# Patient Record
Sex: FEMALE | Race: WHITE | Employment: FULL TIME | URBAN - METROPOLITAN AREA
[De-identification: names, ages, dates, MRNs, and addresses within clinical notes are randomized per-mention and may not be internally consistent; named-entity substitution may affect disease eponyms.]

---

## 2017-01-16 ENCOUNTER — OFFICE VISIT (OUTPATIENT)
Dept: FAMILY MEDICINE CLINIC | Age: 47
End: 2017-01-16

## 2017-01-16 VITALS
OXYGEN SATURATION: 99 % | TEMPERATURE: 98 F | BODY MASS INDEX: 37.39 KG/M2 | RESPIRATION RATE: 20 BRPM | WEIGHT: 211 LBS | SYSTOLIC BLOOD PRESSURE: 130 MMHG | HEART RATE: 92 BPM | HEIGHT: 63 IN | DIASTOLIC BLOOD PRESSURE: 87 MMHG

## 2017-01-16 DIAGNOSIS — G43.009 NONINTRACTABLE MIGRAINE, UNSPECIFIED MIGRAINE TYPE: ICD-10-CM

## 2017-01-16 DIAGNOSIS — F33.1 MODERATE EPISODE OF RECURRENT MAJOR DEPRESSIVE DISORDER (HCC): ICD-10-CM

## 2017-01-16 DIAGNOSIS — E03.9 HYPOTHYROIDISM, UNSPECIFIED TYPE: Chronic | ICD-10-CM

## 2017-01-16 DIAGNOSIS — L60.0 INGROWN TOENAIL: ICD-10-CM

## 2017-01-16 DIAGNOSIS — F33.1 MODERATE EPISODE OF RECURRENT MAJOR DEPRESSIVE DISORDER (HCC): Primary | ICD-10-CM

## 2017-01-16 DIAGNOSIS — B35.1 NAIL FUNGAL INFECTION: ICD-10-CM

## 2017-01-16 RX ORDER — ZOLMITRIPTAN 5 MG/1
5 TABLET, FILM COATED ORAL AS NEEDED
Qty: 10 TAB | Refills: 3 | Status: SHIPPED | OUTPATIENT
Start: 2017-01-16 | End: 2017-07-28 | Stop reason: SDUPTHER

## 2017-01-16 RX ORDER — TERBINAFINE HYDROCHLORIDE 250 MG/1
250 TABLET ORAL DAILY
Qty: 90 TAB | Refills: 0 | Status: SHIPPED | OUTPATIENT
Start: 2017-01-16 | End: 2019-05-13

## 2017-01-16 RX ORDER — VENLAFAXINE HYDROCHLORIDE 75 MG/1
75 CAPSULE, EXTENDED RELEASE ORAL DAILY
Qty: 30 CAP | Refills: 3 | Status: SHIPPED | OUTPATIENT
Start: 2017-01-16 | End: 2017-02-28 | Stop reason: SDUPTHER

## 2017-01-16 RX ORDER — LEVOTHYROXINE SODIUM 150 UG/1
150 TABLET ORAL
Qty: 30 TAB | Refills: 5 | Status: SHIPPED | OUTPATIENT
Start: 2017-01-16 | End: 2017-06-20 | Stop reason: SDUPTHER

## 2017-01-16 RX ORDER — SUMATRIPTAN 50 MG/1
50 TABLET, FILM COATED ORAL
Qty: 10 TAB | Refills: 3 | Status: SHIPPED | OUTPATIENT
Start: 2017-01-16 | End: 2017-07-28 | Stop reason: SDUPTHER

## 2017-01-16 NOTE — PATIENT INSTRUCTIONS
Depression and Chronic Disease: Care Instructions  Your Care Instructions  A chronic disease is one that you have for a long time. Some chronic diseases can be controlled, but they usually cannot be cured. Depression is common in people with chronic diseases, but it often goes unnoticed. Many people have concerns about seeking treatment for a mental health problem. You may think it's a sign of weakness, or you don't want people to know about it. It's important to overcome these reasons for not seeking treatment. Treating depression or anxiety is good for your health. Follow-up care is a key part of your treatment and safety. Be sure to make and go to all appointments, and call your doctor if you are having problems. It's also a good idea to know your test results and keep a list of the medicines you take. How can you care for yourself at home? Watch for symptoms of depression  The symptoms of depression are often subtle at first. You may think they are caused by your disease rather than depression. Or you may think it is normal to be depressed when you have a chronic disease. If you are depressed you may:  · Feel sad or hopeless. · Feel guilty or worthless. · Not enjoy the things you used to enjoy. · Feel hopeless, as though life is not worth living. · Have trouble thinking or remembering. · Have low energy, and you may not eat or sleep well. · Pull away from others. · Think often about death or killing yourself. (Keep the numbers for these national suicide hotlines: 4-229-991-TALK [1-821.157.2831] and 2-350-SYYLVZS [1-239.229.5817]. )  Get treatment  By treating your depression, you can feel more hopeful and have more energy. If you feel better, you may take better care of yourself, so your health may improve. · Talk to your doctor if you have any changes in mood during treatment for your disease. · Ask your doctor for help.  Counseling, antidepressant medicine, or a combination of the two can help most people with depression. Often a combination works best. Counseling can also help you cope with having a chronic disease. When should you call for help? Call 911 anytime you think you may need emergency care. For example, call if:  · You feel like hurting yourself or someone else. · Someone you know has depression and is about to attempt or is attempting suicide. Call your doctor now or seek immediate medical care if:  · You hear voices. · Someone you know has depression and:  ¨ Starts to give away his or her possessions. ¨ Uses illegal drugs or drinks alcohol heavily. ¨ Talks or writes about death, including writing suicide notes or talking about guns, knives, or pills. ¨ Starts to spend a lot of time alone. ¨ Acts very aggressively or suddenly appears calm. Watch closely for changes in your health, and be sure to contact your doctor if:  · You do not get better as expected. Where can you learn more? Go to http://nathaly-jp.info/. Enter V704 in the search box to learn more about \"Depression and Chronic Disease: Care Instructions. \"  Current as of: July 26, 2016  Content Version: 11.1  © 3326-3266 [employer]. Care instructions adapted under license by [employer] (which disclaims liability or warranty for this information). If you have questions about a medical condition or this instruction, always ask your healthcare professional. [address] any warranty or liability for your use of this information. Depression and Chronic Disease: Care Instructions  Your Care Instructions  A chronic disease is one that you have for a long time. Some chronic diseases can be controlled, but they usually cannot be cured. Depression is common in people with chronic diseases, but it often goes unnoticed. Many people have concerns about seeking treatment for a mental health problem.  You may think it's a sign of weakness, or you don't want people to know about it. It's important to overcome these reasons for not seeking treatment. Treating depression or anxiety is good for your health. Follow-up care is a key part of your treatment and safety. Be sure to make and go to all appointments, and call your doctor if you are having problems. It's also a good idea to know your test results and keep a list of the medicines you take. How can you care for yourself at home? Watch for symptoms of depression  The symptoms of depression are often subtle at first. You may think they are caused by your disease rather than depression. Or you may think it is normal to be depressed when you have a chronic disease. If you are depressed you may:  · Feel sad or hopeless. · Feel guilty or worthless. · Not enjoy the things you used to enjoy. · Feel hopeless, as though life is not worth living. · Have trouble thinking or remembering. · Have low energy, and you may not eat or sleep well. · Pull away from others. · Think often about death or killing yourself. (Keep the numbers for these national suicide hotlines: 2-725-559-TALK [1-729.318.9990] and 2-063-WBESKFS [1-659.764.2795]. )  Get treatment  By treating your depression, you can feel more hopeful and have more energy. If you feel better, you may take better care of yourself, so your health may improve. · Talk to your doctor if you have any changes in mood during treatment for your disease. · Ask your doctor for help. Counseling, antidepressant medicine, or a combination of the two can help most people with depression. Often a combination works best. Counseling can also help you cope with having a chronic disease. When should you call for help? Call 911 anytime you think you may need emergency care. For example, call if:  · You feel like hurting yourself or someone else. · Someone you know has depression and is about to attempt or is attempting suicide.   Call your doctor now or seek immediate medical care if:  · You hear voices. · Someone you know has depression and:  ¨ Starts to give away his or her possessions. ¨ Uses illegal drugs or drinks alcohol heavily. ¨ Talks or writes about death, including writing suicide notes or talking about guns, knives, or pills. ¨ Starts to spend a lot of time alone. ¨ Acts very aggressively or suddenly appears calm. Watch closely for changes in your health, and be sure to contact your doctor if:  · You do not get better as expected. Where can you learn more? Go to http://nathaly-jp.info/. Enter X478 in the search box to learn more about \"Depression and Chronic Disease: Care Instructions. \"  Current as of: July 26, 2016  Content Version: 11.1  © 7102-4705 [employer]. Care instructions adapted under license by [employer] (which disclaims liability or warranty for this information). If you have questions about a medical condition or this instruction, always ask your healthcare professional. [place] [address] any warranty or liability for your use of this information. Toenail Fungus: Care Instructions  Your Care Instructions  A toenail that is infected by a fungus usually turns white or yellow. As the fungus spreads, the nail turns a darker color and gets thicker, and its edges start to turn ragged and crumble. A bad infection can cause toe pain, and the nail may pull away from the toe. Toenails that are exposed to moisture and warmth a lot are more likely to get infected by a fungus. This can happen from wearing sweaty shoes often and from walking barefoot on shower floors. It is hard to treat toenail fungus, and the infection can return after it has cleared up. But medicines can sometimes get rid of toenail fungus for good. If the infection is very bad, or if it causes a lot of pain, you may need to have the nail removed. Follow-up care is a key part of your treatment and safety.  Be sure to make and go to all appointments, and call your doctor if you are having problems. Its also a good idea to know your test results and keep a list of the medicines you take. How can you care for yourself at home? · Take your medicines exactly as prescribed. Call your doctor if you have any problems with your medicine. You will get more details on the specific medicines your doctor prescribes. · If your doctor gave you a cream or liquid to put on your toenail, use it exactly as directed. · Wash your feet often, and wash your hands after touching your feet. · Keep your toenails clean and dry. Dry your feet completely after you bathe and before you put on shoes and socks. · Keep your toenails trimmed. · Change socks often. Wear dry socks that absorb moisture. · Do not go barefoot in public places. · Use a spray or powder that fights fungus on your feet and in your shoes. · Do not pick at the skin around your nails. · Do not use nail polish or fake nails on your toenails. When should you call for help? Call your doctor now or seek immediate medical care if:  · You have signs of infection, such as:  ¨ Increased pain, swelling, warmth, or redness. ¨ Red streaks leading from the site. ¨ Pus draining from the site. ¨ A fever. · You have new or increased toe pain. Watch closely for changes in your health, and be sure to contact your doctor if:  · You do not get better as expected. Where can you learn more? Go to http://nathaly-jp.info/. Enter D202 in the search box to learn more about \"Toenail Fungus: Care Instructions. \"  Current as of: February 5, 2016  Content Version: 11.1  © 1984-5238 [employer]. Care instructions adapted under license by [employer] (which disclaims liability or warranty for this information).  If you have questions about a medical condition or this instruction, always ask your healthcare professional. Zuly Robles disclaims any warranty or liability for your use of this information.

## 2017-01-16 NOTE — LETTER
1/16/2017 11:25 AM 
 
Ms. Brandon Chaves 
[address] [facility] [address] You were referred to Podiatry. Please reference your appointment listed below: 
 
Dr. Shaun Becerril. [address] [place], [address] [facility] [address],[place] [address] 
[phone] Appointment Information: February 1, 2017 at 1:30pm. 
 
 
 
Sincerely, [facility]

## 2017-01-26 ENCOUNTER — OFFICE VISIT (OUTPATIENT)
Dept: FAMILY MEDICINE CLINIC | Age: 47
End: 2017-01-26

## 2017-01-26 VITALS
SYSTOLIC BLOOD PRESSURE: 130 MMHG | HEART RATE: 96 BPM | HEIGHT: 63 IN | OXYGEN SATURATION: 95 % | DIASTOLIC BLOOD PRESSURE: 87 MMHG | BODY MASS INDEX: 37.39 KG/M2 | TEMPERATURE: 98.7 F | WEIGHT: 211 LBS | RESPIRATION RATE: 20 BRPM

## 2017-01-26 DIAGNOSIS — J40 BRONCHITIS: Primary | ICD-10-CM

## 2017-01-26 RX ORDER — AZITHROMYCIN 250 MG/1
TABLET, FILM COATED ORAL
Qty: 6 TAB | Refills: 0 | Status: SHIPPED | OUTPATIENT
Start: 2017-01-26 | End: 2017-01-31

## 2017-01-26 RX ORDER — FLUCONAZOLE 150 MG/1
150 TABLET ORAL DAILY
Qty: 1 TAB | Refills: 0 | Status: SHIPPED | OUTPATIENT
Start: 2017-01-26 | End: 2017-01-27

## 2017-01-26 NOTE — PATIENT INSTRUCTIONS
Bronchitis: Care Instructions  Your Care Instructions    Bronchitis is inflammation of the bronchial tubes, which carry air to the lungs. The tubes swell and produce mucus, or phlegm. The mucus and inflamed bronchial tubes make you cough. You may have trouble breathing. Most cases of bronchitis are caused by viruses like those that cause colds. Antibiotics usually do not help and they may be harmful. Bronchitis usually develops rapidly and lasts about 2 to 3 weeks in otherwise healthy people. Follow-up care is a key part of your treatment and safety. Be sure to make and go to all appointments, and call your doctor if you are having problems. It's also a good idea to know your test results and keep a list of the medicines you take. How can you care for yourself at home? · Take all medicines exactly as prescribed. Call your doctor if you think you are having a problem with your medicine. · Get some extra rest.  · Take an over-the-counter pain medicine, such as acetaminophen (Tylenol), ibuprofen (Advil, Motrin), or naproxen (Aleve) to reduce fever and relieve body aches. Read and follow all instructions on the label. · Do not take two or more pain medicines at the same time unless the doctor told you to. Many pain medicines have acetaminophen, which is Tylenol. Too much acetaminophen (Tylenol) can be harmful. · Take an over-the-counter cough medicine that contains dextromethorphan to help quiet a dry, hacking cough so that you can sleep. Avoid cough medicines that have more than one active ingredient. Read and follow all instructions on the label. · Breathe moist air from a humidifier, hot shower, or sink filled with hot water. The heat and moisture will thin mucus so you can cough it out. · Do not smoke. Smoking can make bronchitis worse. If you need help quitting, talk to your doctor about stop-smoking programs and medicines. These can increase your chances of quitting for good.   When should you call for help? Call 911 anytime you think you may need emergency care. For example, call if:  · You have severe trouble breathing. Call your doctor now or seek immediate medical care if:  · You have new or worse trouble breathing. · You cough up dark brown or bloody mucus (sputum). · You have a new or higher fever. · You have a new rash. Watch closely for changes in your health, and be sure to contact your doctor if:  · You cough more deeply or more often, especially if you notice more mucus or a change in the color of your mucus. · You are not getting better as expected. Where can you learn more? Go to http://nathaly-jp.info/. Enter H333 in the search box to learn more about \"Bronchitis: Care Instructions. \"  Current as of: May 23, 2016  Content Version: 11.1  © 3289-3568 [employer]. Care instructions adapted under license by [employer] (which disclaims liability or warranty for this information). If you have questions about a medical condition or this instruction, always ask your healthcare professional. [address] any warranty or liability for your use of this information.

## 2017-01-26 NOTE — MR AVS SNAPSHOT
Visit Information Date & Time Provider Department Dept. Phone Encounter #  
 1/26/2017  4:45 PM Uriel Mariee MD [facility] [phone] [id number] Upcoming Health Maintenance Date Due DTaP/Tdap/Td series (1 - Tdap) 10/25/1992 INFLUENZA AGE 9 TO ADULT 8/1/2016 PAP AKA CERVICAL CYTOLOGY 9/23/2018 Allergies as of 1/26/2017  Review Complete On: 1/26/2017 By: Uriel Mariee MD  
  
 Severity Noted Reaction Type Reactions Anesthetics - Niurka Type- Parabens  08/15/2016   Side Effect Itching Sulfa (Sulfonamide Antibiotics)  07/28/2014    Shortness of Breath Anesthetics - Amide Type Low 08/15/2016   Side Effect Itching Current Immunizations  Reviewed on 10/12/2015 Name Date Influenza Vaccine 10/12/2015, 1/9/2015 Not reviewed this visit You Were Diagnosed With   
  
 Codes Comments Bronchitis    -  Primary ICD-10-CM: L73 ICD-9-CM: 389 Vitals BP Pulse Temp Resp Height(growth percentile) Weight(growth percentile) 130/87 (BP 1 Location: Left arm, BP Patient Position: Sitting) 96 98.7 °F (37.1 °C) (Oral) 20 5' 3\" (1.6 m) 211 lb (95.7 kg) SpO2 BMI OB Status Smoking Status 95% 37.38 kg/m2 Hysterectomy Never Smoker Vitals History BMI and BSA Data Body Mass Index Body Surface Area  
 37.38 kg/m 2 2.06 m 2 Preferred Pharmacy Pharmacy Name Phone [facility], [address] [place] [address] [phone] Your Updated Medication List  
  
   
This list is accurate as of: 1/26/17  5:13 PM.  Always use your most recent med list.  
  
  
  
  
 albuterol 90 mcg/actuation inhaler Commonly known as:  PROVENTIL HFA, VENTOLIN HFA, PROAIR HFA Take 2 puffs by inhalation every four (4) hours as needed for Wheezing or Shortness of Breath. azithromycin 250 mg tablet Commonly known as:  Karmen Christy Take 2 tablets today, then take 1 tablet daily COLACE 100 mg capsule Generic drug:  docusate sodium Take 100 mg by mouth two (2) times a day. fluconazole 150 mg tablet Commonly known as:  DIFLUCAN Take 1 Tab by mouth daily for 1 day. Only take after you have completed the full antibiotic (zpac course) ibuprofen 800 mg tablet Commonly known as:  MOTRIN Take 1 Tab by mouth three (3) times daily as needed for Pain.  
  
 levothyroxine 150 mcg tablet Commonly known as:  SYNTHROID Take 1 Tab by mouth Daily (before breakfast). LINZESS 145 mcg Cap capsule Generic drug:  linaclotide Take 145 mcg by mouth Daily (before breakfast). MINIVELLE 0.05 mg/24 hr  
Generic drug:  estradiol 1 patch by TransDERmal route Every Saturday. multivitamin tablet Commonly known as:  ONE A DAY Take 1 Tab by mouth daily. oxyCODONE-acetaminophen 5-325 mg per tablet Commonly known as:  PERCOCET  
1-2 tablets every 4-6 hours prn pain SUMAtriptan 50 mg tablet Commonly known as:  IMITREX Take 1 Tab by mouth once as needed for Migraine for up to 1 dose. terbinafine HCl 250 mg tablet Commonly known as:  LAMISIL Take 1 Tab by mouth daily. venlafaxine-SR 75 mg capsule Commonly known as:  EFFEXOR XR Take 1 Cap by mouth daily. ZOLMitriptan 5 mg tablet Commonly known as:  ZOMIG Take 1 Tab by mouth as needed for Migraine. Prescriptions Sent to Pharmacy Refills  
 azithromycin (ZITHROMAX) 250 mg tablet 0 Sig: Take 2 tablets today, then take 1 tablet daily Class: Normal  
 Pharmacy: [facility] Chiquis Mcgrath Ph #: [phone]  
 fluconazole (DIFLUCAN) 150 mg tablet 0 Sig: Take 1 Tab by mouth daily for 1 day. Only take after you have completed the full antibiotic (zpac course) Class: Normal  
 Pharmacy: [facility] [place] [address], 9 [facility] Ph #: [phone]  Route: Oral  
  
 Patient Instructions Bronchitis: Care Instructions Your Care Instructions Bronchitis is inflammation of the bronchial tubes, which carry air to the lungs. The tubes swell and produce mucus, or phlegm. The mucus and inflamed bronchial tubes make you cough. You may have trouble breathing. Most cases of bronchitis are caused by viruses like those that cause colds. Antibiotics usually do not help and they may be harmful. Bronchitis usually develops rapidly and lasts about 2 to 3 weeks in otherwise healthy people. Follow-up care is a key part of your treatment and safety. Be sure to make and go to all appointments, and call your doctor if you are having problems. It's also a good idea to know your test results and keep a list of the medicines you take. How can you care for yourself at home? · Take all medicines exactly as prescribed. Call your doctor if you think you are having a problem with your medicine. · Get some extra rest. 
· Take an over-the-counter pain medicine, such as acetaminophen (Tylenol), ibuprofen (Advil, Motrin), or naproxen (Aleve) to reduce fever and relieve body aches. Read and follow all instructions on the label. · Do not take two or more pain medicines at the same time unless the doctor told you to. Many pain medicines have acetaminophen, which is Tylenol. Too much acetaminophen (Tylenol) can be harmful. · Take an over-the-counter cough medicine that contains dextromethorphan to help quiet a dry, hacking cough so that you can sleep. Avoid cough medicines that have more than one active ingredient. Read and follow all instructions on the label. · Breathe moist air from a humidifier, hot shower, or sink filled with hot water. The heat and moisture will thin mucus so you can cough it out. · Do not smoke. Smoking can make bronchitis worse. If you need help quitting, talk to your doctor about stop-smoking programs and medicines. These can increase your chances of quitting for good. When should you call for help? Call 911 anytime you think you may need emergency care. For example, call if: 
· You have severe trouble breathing. Call your doctor now or seek immediate medical care if: 
· You have new or worse trouble breathing. · You cough up dark brown or bloody mucus (sputum). · You have a new or higher fever. · You have a new rash. Watch closely for changes in your health, and be sure to contact your doctor if: 
· You cough more deeply or more often, especially if you notice more mucus or a change in the color of your mucus. · You are not getting better as expected. Where can you learn more? Go to http://nathaly-jp.info/. Enter H333 in the search box to learn more about \"Bronchitis: Care Instructions. \" Current as of: May 23, 2016 Content Version: 11.1 © 2843-8000 [employer]. Care instructions adapted under license by [employer] (which disclaims liability or warranty for this information). If you have questions about a medical condition or this instruction, always ask your healthcare professional. [place] [address] any warranty or liability for your use of this information. Please provide this summary of care documentation to your next provider. Your primary care clinician is listed as Maxime Harris. If you have any questions after today's visit, please call [phone].

## 2017-01-26 NOTE — LETTER
NOTIFICATION OF RETURN TO WORK 
 
1/26/2017 Ms. Thalia Gunn 
[address] [facility] [address] To Whom It May Concern: 
 
Zaida Agustin was under the care of [facility] please excuse her absence on 1/26/2017 & 1/27/2017. She will return to work on 1/30/2017 with no restrictions. If there are questions or concerns please have the patient contact our office. Sincerely, Darshan Cage MD

## 2017-01-26 NOTE — PROGRESS NOTES
Acute Care Visit    Today's Date:  2017   Patient's Name: Connor Hernandes   Patient's :  1970     History:     Chief Complaint   Patient presents with    Cold Symptoms     pt here with c/o cough, nasal and chest congestion       Connor Hernandes is a 40 y.o. female presenting for Acute Care    Bronchitis    Onset: Monday  Reports productive cough w/ clear sputum, nasal/chest congestion  Aggravating/Alleviating factors: nasal spray and albuterol prn no improvements  Treatment at home:  See above  SIck contacts: yes  Smoking history: denies    Past Medical History   Diagnosis Date    Asthma     Contact dermatitis and other eczema, due to unspecified cause      ezcema    Depression     Headache      migraines-[facility]    Heart murmur     HX OTHER MEDICAL      menieres disease    Hypothyroid     Nodule of vagina 2016     nodule vaginal cuff- repeat US in 3-6 mths    JOAN on CPAP     Thyroid disease      HYPOTHYROIDISM     Past Surgical History   Procedure Laterality Date    Hx hysterectomy       also uterine polyp    Pr lap,cholecystectomy N/A 08/15/2016     Dr. 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             Family History   Problem Relation Age of Onset    Diabetes Father      Allergies   Allergen Reactions    Anesthetics - Niurka Type- Parabens Itching    Sulfa (Sulfonamide Antibiotics) Shortness of Breath    Anesthetics - Amide Type Itching       Problem List:      Patient Active Problem List   Diagnosis Code    Hypothyroid E03.9    HLD (hyperlipidemia) E78.5    Obesity E66.9       Medications:     Current Outpatient Prescriptions   Medication Sig    azithromycin (ZITHROMAX) 250 mg tablet Take 2 tablets today, then take 1 tablet daily    fluconazole (DIFLUCAN) 150 mg tablet Take 1 Tab by mouth daily for 1 day. Only take after you have completed the full antibiotic (zpac course)    levothyroxine (SYNTHROID) 150 mcg tablet Take 1 Tab by mouth Daily (before breakfast).  venlafaxine-SR (EFFEXOR XR) 75 mg capsule Take 1 Cap by mouth daily.  SUMAtriptan (IMITREX) 50 mg tablet Take 1 Tab by mouth once as needed for Migraine for up to 1 dose.  ZOLMitriptan (ZOMIG) 5 mg tablet Take 1 Tab by mouth as needed for Migraine.  terbinafine HCl (LAMISIL) 250 mg tablet Take 1 Tab by mouth daily.  linaclotide (LINZESS) 145 mcg cap capsule Take 145 mcg by mouth Daily (before breakfast).  multivitamin (ONE A DAY) tablet Take 1 Tab by mouth daily.  docusate sodium (COLACE) 100 mg capsule Take 100 mg by mouth two (2) times a day.  ibuprofen (MOTRIN) 800 mg tablet Take 1 Tab by mouth three (3) times daily as needed for Pain.  albuterol (PROVENTIL HFA, VENTOLIN HFA, PROAIR HFA) 90 mcg/actuation inhaler Take 2 puffs by inhalation every four (4) hours as needed for Wheezing or Shortness of Breath.  oxyCODONE-acetaminophen (PERCOCET) 5-325 mg per tablet 1-2 tablets every 4-6 hours prn pain    estradiol (MINIVELLE) 0.05 mg/24 hr 1 patch by TransDERmal route Every Saturday. No current facility-administered medications for this visit.           Constitutional: negative for fevers, chills, sweats and fatigue  Ears, nose, mouth, throat, and face: positive for nasal congestion, sore throat and hoarseness, negative for ear drainage and earaches  Respiratory: positive for cough or sputum  Cardiovascular: negative for chest pain, chest pressure/discomfort, dyspnea  Gastrointestinal: negative for nausea, vomiting, diarrhea, constipation and abdominal pain    Physical Assessment:   VS:    Visit Vitals    /87 (BP 1 Location: Left arm, BP Patient Position: Sitting)    Pulse 96    Temp 98.7 °F (37.1 °C) (Oral)    Resp 20    Ht 5' 3\" (1.6 m)    Wt 211 lb (95.7 kg)    SpO2 95%    BMI 37.38 kg/m2       Lab Results   Component Value Date/Time    Sodium 142 08/12/2016 01:30 PM    Potassium 4.1 08/12/2016 01:30 PM    Chloride 106 08/12/2016 01:30 PM    CO2 28 08/12/2016 01:30 PM    Anion gap 8 08/12/2016 01:30 PM    Glucose 113 08/12/2016 01:30 PM    BUN 10 08/12/2016 01:30 PM    Creatinine 0.71 08/12/2016 01:30 PM    BUN/Creatinine ratio 14 08/12/2016 01:30 PM    GFR est AA >60 08/12/2016 01:30 PM    GFR est non-AA >60 08/12/2016 01:30 PM    Calcium 8.9 08/12/2016 01:30 PM       General:   Well-groomed, well-nourished, in no distress, pleasant, alert, appropriate and conversant. ENT:  Normal TMs and mucosa  Mouth:  Good dentition, oropharynx WNL without membranes, exudates, petechiae or ulcers  Neck:   Neck supple, no swelling, mass or tenderness, no thyromegaly  Cardiovasc:   RRR, no MRG. Pulses 2+ and symmetric at distal extremities. Pulmonary:   Lungs clear bilaterally. Normal respiratory effort. Extremities:   No edema, no TTP bilateral calves. LEs warm and well-perfused. Neuro:   Alert and oriented, no focal deficits. No facial asymmetry noted. Skin:    No rashes or jaundice  Psych:  No pressured speech or abnormal thought content        Assessment/Plan & Orders:       1. Bronchitis        Orders Placed This Encounter    azithromycin (ZITHROMAX) 250 mg tablet    fluconazole (DIFLUCAN) 150 mg tablet       Bronchitis   -will send in [facility] recommended patient continue w/ nasal spray and daily antihistamine. Diflucan afterwards if she has vaginitis. Follow up as needed    *Plan of care reviewed with patient.  Patient in agreement with plan and expresses understanding. All questions answered and patient encouraged to call or RTO if further questions or concerns.     Олег Guevara MD  Family Medicine  1/26/2017  5:08 PM

## 2017-02-20 ENCOUNTER — HOSPITAL ENCOUNTER (OUTPATIENT)
Dept: LAB | Age: 47
Discharge: HOME | End: 2017-02-20

## 2017-02-20 ENCOUNTER — OFFICE VISIT (OUTPATIENT)
Dept: FAMILY MEDICINE CLINIC | Age: 47
End: 2017-02-20

## 2017-02-20 VITALS
SYSTOLIC BLOOD PRESSURE: 111 MMHG | BODY MASS INDEX: 37.85 KG/M2 | DIASTOLIC BLOOD PRESSURE: 77 MMHG | RESPIRATION RATE: 18 BRPM | HEIGHT: 63 IN | HEART RATE: 91 BPM | WEIGHT: 213.6 LBS | TEMPERATURE: 99.2 F | OXYGEN SATURATION: 97 %

## 2017-02-20 DIAGNOSIS — J45.21 INTERMITTENT ASTHMA, WITH ACUTE EXACERBATION: Primary | ICD-10-CM

## 2017-02-20 DIAGNOSIS — M54.50 ACUTE LEFT-SIDED LOW BACK PAIN WITHOUT SCIATICA: ICD-10-CM

## 2017-02-20 PROCEDURE — 99001 SPECIMEN HANDLING PT-LAB: CPT | Performed by: FAMILY MEDICINE

## 2017-02-20 RX ORDER — CYCLOBENZAPRINE HCL 10 MG
10 TABLET ORAL
Qty: 15 TAB | Refills: 0 | Status: SHIPPED | OUTPATIENT
Start: 2017-02-20 | End: 2017-04-04 | Stop reason: SDUPTHER

## 2017-02-20 RX ORDER — PREDNISONE 10 MG/1
TABLET ORAL
Qty: 30 TAB | Refills: 0 | Status: SHIPPED | OUTPATIENT
Start: 2017-02-20 | End: 2017-04-05 | Stop reason: CLARIF

## 2017-02-20 RX ORDER — HYDROCODONE POLISTIREX AND CHLORPHENIRAMINE POLISTIREX 10; 8 MG/5ML; MG/5ML
5 SUSPENSION, EXTENDED RELEASE ORAL
Qty: 115 ML | Refills: 0 | Status: SHIPPED | OUTPATIENT
Start: 2017-02-20 | End: 2017-04-05 | Stop reason: CLARIF

## 2017-02-20 NOTE — MR AVS SNAPSHOT
Visit Information Date & Time Provider Department Dept. Phone Encounter #  
 2/20/2017  9:15 [phone] [id number] Upcoming Health Maintenance Date Due DTaP/Tdap/Td series (1 - Tdap) 10/25/1992 INFLUENZA AGE 9 TO ADULT 8/1/2016 PAP AKA CERVICAL CYTOLOGY 9/23/2018 Allergies as of 2/20/2017  Review Complete On: 2/20/2017 By: Zaki Zapata LPN Severity Noted Reaction Type Reactions Anesthetics - Niurka Type- Parabens  08/15/2016   Side Effect Itching Sulfa (Sulfonamide Antibiotics)  07/28/2014    Shortness of Breath Anesthetics - Amide Type Low 08/15/2016   Side Effect Itching Current Immunizations  Reviewed on 10/12/2015 Name Date Influenza Vaccine 10/12/2015, 1/9/2015 Not reviewed this visit You Were Diagnosed With   
  
 Codes Comments Intermittent asthma, with acute exacerbation    -  Primary ICD-10-CM: J45.21 ICD-9-CM: 192.55 Acute left-sided low back pain without sciatica     ICD-10-CM: M54.5 ICD-9-CM: 724.2 Vitals BP Pulse Temp Resp Height(growth percentile) Weight(growth percentile) 111/77 (BP 1 Location: Right arm, BP Patient Position: Sitting) 91 99.2 °F (37.3 °C) (Oral) 18 5' 3\" (1.6 m) 213 lb 9.6 oz (96.9 kg) SpO2 BMI OB Status Smoking Status 97% 37.84 kg/m2 Hysterectomy Never Smoker BMI and BSA Data Body Mass Index Body Surface Area  
 37.84 kg/m 2 2.08 m 2 Preferred Pharmacy Pharmacy Name Phone [facility] [address] [facility], [facility] [phone] Your Updated Medication List  
  
   
This list is accurate as of: 2/20/17 10:30 AM.  Always use your most recent med list.  
  
  
  
  
 albuterol 90 mcg/actuation inhaler Commonly known as:  PROVENTIL HFA, VENTOLIN HFA, PROAIR HFA Take 2 puffs by inhalation every four (4) hours as needed for Wheezing or Shortness of Breath. chlorpheniramine-HYDROcodone 10-8 mg/5 mL suspension Commonly known as:  Lucero Ped Take 5 mL by mouth every twelve (12) hours as needed for Cough. Max Daily Amount: 10 mL. cyclobenzaprine 10 mg tablet Commonly known as:  FLEXERIL Take 1 Tab by mouth three (3) times daily as needed for Muscle Spasm(s). ibuprofen 800 mg tablet Commonly known as:  MOTRIN Take 1 Tab by mouth three (3) times daily as needed for Pain.  
  
 levothyroxine 150 mcg tablet Commonly known as:  SYNTHROID Take 1 Tab by mouth Daily (before breakfast). LINZESS 145 mcg Cap capsule Generic drug:  linaclotide Take 145 mcg by mouth Daily (before breakfast). multivitamin tablet Commonly known as:  ONE A DAY Take 1 Tab by mouth daily. predniSONE 10 mg tablet Commonly known as:  Pina Fossa Take 4 tabs daily for 3 days, then 3 tabs daily for 3 days, then 2 tabs daily for 3 days, then 1 tab daily for 3 days SUMAtriptan 50 mg tablet Commonly known as:  IMITREX Take 1 Tab by mouth once as needed for Migraine for up to 1 dose. terbinafine HCl 250 mg tablet Commonly known as:  LAMISIL Take 1 Tab by mouth daily. venlafaxine-SR 75 mg capsule Commonly known as:  EFFEXOR XR Take 1 Cap by mouth daily. ZOLMitriptan 5 mg tablet Commonly known as:  ZOMIG Take 1 Tab by mouth as needed for Migraine. Prescriptions Printed Refills  
 chlorpheniramine-HYDROcodone (TUSSIONEX) 10-8 mg/5 mL suspension 0 Sig: Take 5 mL by mouth every twelve (12) hours as needed for Cough. Max Daily Amount: 10 mL. Class: Print Route: Oral  
  
Prescriptions Sent to Pharmacy Refills  
 predniSONE (DELTASONE) 10 mg tablet 0 Sig: Take 4 tabs daily for 3 days, then 3 tabs daily for 3 days, then 2 tabs daily for 3 days, then 1 tab daily for 3 days  Class: Normal  
 [id number] [address] [facility] [place] Ph #: [phone]  
 cyclobenzaprine (FLEXERIL) 10 mg tablet 0 Sig: Take 1 Tab by mouth three (3) times daily as needed for Muscle Spasm(s). Class: Normal  
 Pharmacy: [id number] [facility], [address] [place] [address] Ph #: [phone] Route: Oral  
  
 Please provide this summary of care documentation to your next provider. Your primary care clinician is listed as Esmer Daniels. If you have any questions after today's visit, please call [phone].

## 2017-02-20 NOTE — PROGRESS NOTES
Patient: Brandon Chaves MRN: [id number]  SSN: [id number]    YOB: 1970  Age: 40 y.o. Sex: female      Date of Service: 2/20/2017   Provider: VITALIY Noguera   Office Location:   [address] [place] [facility], [address] [facility], [address]  Office Phone: [phone]  Office Fax: [phone]        REASON FOR VISIT:   Chief Complaint   Patient presents with    LOW BACK PAIN     left side been going on for 2 months    Numbness     in right arm from elbow down for hours at the time    Cold Symptoms     vomiting, fever 101 being the highest, bad for 1 day        VITALS:   Visit Vitals    /77 (BP 1 Location: Right arm, BP Patient Position: Sitting)    Pulse 91    Temp 99.2 °F (37.3 °C) (Oral)    Resp 18    Ht 5' 3\" (1.6 m)    Wt 213 lb 9.6 oz (96.9 kg)    SpO2 97%    BMI 37.84 kg/m2       MEDICATIONS:   Current Outpatient Prescriptions   Medication Sig Dispense Refill    levothyroxine (SYNTHROID) 150 mcg tablet Take 1 Tab by mouth Daily (before breakfast). 30 Tab 5    venlafaxine-SR (EFFEXOR XR) 75 mg capsule Take 1 Cap by mouth daily. 30 Cap 3    SUMAtriptan (IMITREX) 50 mg tablet Take 1 Tab by mouth once as needed for Migraine for up to 1 dose. 10 Tab 3    ZOLMitriptan (ZOMIG) 5 mg tablet Take 1 Tab by mouth as needed for Migraine. 10 Tab 3    terbinafine HCl (LAMISIL) 250 mg tablet Take 1 Tab by mouth daily. 90 Tab 0    linaclotide (LINZESS) 145 mcg cap capsule Take 145 mcg by mouth Daily (before breakfast).  multivitamin (ONE A DAY) tablet Take 1 Tab by mouth daily.  docusate sodium (COLACE) 100 mg capsule Take 100 mg by mouth two (2) times a day.  oxyCODONE-acetaminophen (PERCOCET) 5-325 mg per tablet 1-2 tablets every 4-6 hours prn pain 40 Tab 0    ibuprofen (MOTRIN) 800 mg tablet Take 1 Tab by mouth three (3) times daily as needed for Pain.  40 Tab 0    estradiol (MINIVELLE) 0.05 mg/24 hr 1 patch by TransDERmal route Every Saturday.  albuterol (PROVENTIL HFA, VENTOLIN HFA, PROAIR HFA) 90 mcg/actuation inhaler Take 2 puffs by inhalation every four (4) hours as needed for Wheezing or Shortness of Breath. 1 Inhaler 3        ALLERGIES:   Allergies   Allergen Reactions    Anesthetics - Niurka Type- Parabens Itching    Sulfa (Sulfonamide Antibiotics) Shortness of Breath    Anesthetics - Amide Type Itching        ACTIVE MEDICAL PROBLEMS:  Patient Active Problem List   Diagnosis Code    Hypothyroid E03.9    HLD (hyperlipidemia) E78.5    Obesity E66.9        MEDICAL/SURGICAL HISTORY:  Past Medical History   Diagnosis Date    Asthma     Contact dermatitis and other eczema, due to unspecified cause      ezcema    Depression     Headache      migraines-[facility] with [facility] at [facility]    Heart murmur     HX OTHER MEDICAL      menieres disease    Hypothyroid     Nodule of vagina 7/2016     nodule vaginal cuff- repeat US in 3-6 mths    JOAN on CPAP     Thyroid disease      HYPOTHYROIDISM      Past Surgical History   Procedure Laterality Date    Hx hysterectomy  2013     also uterine polyp    Pr lap,cholecystectomy N/A 08/15/2016     Dr. Dakota Conroy Pr abdomen surgery proc unlisted      Hx cholecystectomy  08/2016        FAMILY HISTORY:  Family History   Problem Relation Age of Onset    Diabetes Father         SOCIAL HISTORY:  Social History   Substance Use Topics    Smoking status: Never Smoker    Smokeless tobacco: Never Used    Alcohol use No            HISTORY OF PRESENT ILLNESS:   Rohit Gonsales is a 40 y.o. female who presents to the office for evaluation of multiple acute complaints. Low back pain -  Patient has a history of low back pain with sciatica, which she normally manages with stretching and chiropractic adjustments. Complains of worsening pain x about 2 months. Pain originates from left low back, but radiates into upper back and neck. Pain is exacerbated by movement of the lumbar spine.  Symptoms also worsen throughout the day. 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 She has been taking ibuprofen 800 mg with minimal relief. Denies radiation to legs, numbness/tingling/weakness of legs, bowel/bladder dysfunction. Right arm paresthesia -   Patient also notes that she has been experiencing some intermittent numbness and tingling of her R forearm. Symptoms were initially only occurring while sleeping, so patient assumed it was secondary to sleeping in a certain position. However, she now reports that \"pins and needles\" sensation can be triggered by lifting her arm overhead. Symptoms usually resolve spontaneously, but have lasted as long as 2 hours at a time. She denies weakness or decreased  strength. Admits to neck pain and stiffness, but no known injury. No pain in shoulder, elbow, or wrist.     Cough - Cough started Saturday night 2/18. 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 Cough is mostly dry and non-productive. Patient has a history of asthma. Has not been taking albuterol inhaler as it makes her feel jittery, however she is starting to feel like she is wheezing. Yesterday had a fever of 101 F, treated with OTC medications. No fever yet this morning. Also had 1 episode of vomiting yesterday. No nasal congestion, sore throat, ear pain, diarrhea, rashes. REVIEW OF SYSTEMS:  Review of Systems   Constitutional: Positive for fever and malaise/fatigue. Negative for chills. HENT: Negative for congestion, ear pain and sore throat. Respiratory: Positive for cough and wheezing. Negative for sputum production and shortness of breath. Cardiovascular: Negative for chest pain and palpitations. Gastrointestinal: Positive for abdominal pain, nausea and vomiting. Negative for constipation and diarrhea. Musculoskeletal: Positive for back pain and neck pain. Skin: Negative for itching and rash.         PHYSICAL EXAMINATION:  Physical Exam Constitutional: She is well-developed, well-nourished, and in no distress. HENT:   Head: Normocephalic and atraumatic. Right Ear: External ear normal.   Left Ear: External ear normal.   Nose: Nose normal.   Mouth/Throat: Oropharynx is clear and moist.   Eyes: Conjunctivae are normal.   Neck: Neck supple. Cardiovascular: Normal rate, regular rhythm and normal heart sounds. Exam reveals no gallop and no friction rub. No murmur heard. Pulmonary/Chest: Effort normal. She has wheezes. She has no rales. Abdominal: Soft. Bowel sounds are normal. She exhibits no distension. There is no tenderness. Lymphadenopathy:     She has cervical adenopathy. Skin: Skin is warm and dry. No rash noted. RESULTS:  No results found for this visit on 02/20/17. ASSESSMENT/PLAN:  Amira Lorenzo was seen today for low back pain, numbness and cold symptoms. Diagnoses and all orders for this visit:    Intermittent asthma, with acute exacerbation  - Asthma exacerbation likely triggered by viral illness  - Start prednisone taper as below  - Tussionex for cough   - Advised to start using albuterol inhaler during acute illness  Orders:   -     predniSONE (DELTASONE) 10 mg tablet; Take 4 tabs daily for 3 days, then 3 tabs daily for 3 days, then 2 tabs daily for 3 days, then 1 tab daily for 3 days  -     chlorpheniramine-HYDROcodone (TUSSIONEX) 10-8 mg/5 mL suspension; Take 5 mL by mouth every twelve (12) hours as needed for Cough. Max Daily Amount: 10 mL. Acute left-sided low back pain without sciatica  - Am hopeful that prednisone may help with low back pain as well as possible nerve impingement in right arm of unclear etiology  - Will add muscle relaxer for use at bedtime  - May require imaging/EMG if symptoms persist   Orders:   -     predniSONE (DELTASONE) 10 mg tablet;  Take 4 tabs daily for 3 days, then 3 tabs daily for 3 days, then 2 tabs daily for 3 days, then 1 tab daily for 3 days  -     cyclobenzaprine (FLEXERIL) 10 mg tablet; Take 1 Tab by mouth three (3) times daily as needed for Muscle Spasm(s). Follow up as needed/as scheduled with PCP    Patient expresses understanding and is agreeable with the above plan.         PATIENT CARE TEAM:   Patient Care Team:  Branden Villegas MD as PCP - General (Family Practice)  Lukas Harrison MD (Neurology)  Francia Hussein DO (Internal Medicine)       [place], [place]   February 20, 2017    10:41 AM

## 2017-02-20 NOTE — LETTER
NOTIFICATION RETURN TO WORK / SCHOOL 
 
2/20/2017 9:59 AM 
 
Ms. Tiffany Valerio 
[address] [facility] [address] To Whom It May Concern: 
 
Tiffany Valerio is currently under the care of [facility]. She will return to work/school on: 2/22/17 or sooner if able. If there are questions or concerns please have the patient contact our office.  
 
 
 
Sincerely, 
 
 
VITALIY Tripathi

## 2017-02-21 LAB
ALBUMIN SERPL-MCNC: 4.2 G/DL (ref 3.5–5.5)
ALBUMIN/GLOB SERPL: 1.6 {RATIO} (ref 1.1–2.5)
ALP SERPL-CCNC: 109 IU/L (ref 39–117)
ALT SERPL-CCNC: 69 IU/L (ref 0–44)
AST SERPL-CCNC: 46 IU/L (ref 0–40)
BILIRUB SERPL-MCNC: 0.3 MG/DL (ref 0–1.2)
BUN SERPL-MCNC: 8 MG/DL (ref 6–24)
BUN/CREAT SERPL: 9 (ref 9–20)
CALCIUM SERPL-MCNC: 8.9 MG/DL (ref 8.7–10.2)
CHLORIDE SERPL-SCNC: 100 MMOL/L (ref 96–106)
CHOLEST SERPL-MCNC: 190 MG/DL (ref 100–199)
CO2 SERPL-SCNC: 25 MMOL/L (ref 18–29)
CREAT SERPL-MCNC: 0.87 MG/DL (ref 0.76–1.27)
GLOBULIN SER CALC-MCNC: 2.6 G/DL (ref 1.5–4.5)
GLUCOSE SERPL-MCNC: 100 MG/DL (ref 65–99)
HDLC SERPL-MCNC: 52 MG/DL
INTERPRETATION, 910389: NORMAL
LDLC SERPL CALC-MCNC: 122 MG/DL (ref 0–99)
POTASSIUM SERPL-SCNC: 3.9 MMOL/L (ref 3.5–5.2)
PROT SERPL-MCNC: 6.8 G/DL (ref 6–8.5)
SODIUM SERPL-SCNC: 141 MMOL/L (ref 134–144)
TRIGL SERPL-MCNC: 78 MG/DL (ref 0–149)
TSH SERPL DL<=0.005 MIU/L-ACNC: 1.42 UIU/ML (ref 0.45–4.5)
VLDLC SERPL CALC-MCNC: 16 MG/DL (ref 5–40)

## 2017-02-28 ENCOUNTER — OFFICE VISIT (OUTPATIENT)
Dept: FAMILY MEDICINE CLINIC | Age: 47
End: 2017-02-28

## 2017-02-28 VITALS
DIASTOLIC BLOOD PRESSURE: 77 MMHG | SYSTOLIC BLOOD PRESSURE: 115 MMHG | HEIGHT: 63 IN | HEART RATE: 82 BPM | TEMPERATURE: 98.4 F | BODY MASS INDEX: 38.62 KG/M2 | WEIGHT: 218 LBS | OXYGEN SATURATION: 97 % | RESPIRATION RATE: 18 BRPM

## 2017-02-28 DIAGNOSIS — J45.901 ASTHMA EXACERBATION: Primary | ICD-10-CM

## 2017-02-28 DIAGNOSIS — G43.009 NONINTRACTABLE MIGRAINE, UNSPECIFIED MIGRAINE TYPE: ICD-10-CM

## 2017-02-28 DIAGNOSIS — F33.1 MODERATE EPISODE OF RECURRENT MAJOR DEPRESSIVE DISORDER (HCC): ICD-10-CM

## 2017-02-28 DIAGNOSIS — J40 BRONCHITIS: ICD-10-CM

## 2017-02-28 RX ORDER — ALBUTEROL SULFATE 90 UG/1
2 AEROSOL, METERED RESPIRATORY (INHALATION)
Qty: 1 INHALER | Refills: 3 | Status: SHIPPED | OUTPATIENT
Start: 2017-02-28

## 2017-02-28 RX ORDER — VENLAFAXINE HYDROCHLORIDE 150 MG/1
150 CAPSULE, EXTENDED RELEASE ORAL DAILY
Qty: 30 CAP | Refills: 1 | Status: SHIPPED | OUTPATIENT
Start: 2017-02-28 | End: 2017-06-20 | Stop reason: DRUGHIGH

## 2017-02-28 RX ORDER — AZITHROMYCIN 250 MG/1
TABLET, FILM COATED ORAL
Qty: 6 TAB | Refills: 0 | Status: SHIPPED | OUTPATIENT
Start: 2017-02-28 | End: 2017-03-05

## 2017-02-28 NOTE — MR AVS SNAPSHOT
Visit Information Date & Time Provider Department Dept. Phone Encounter #  
 2/28/2017 11:15 AM Cortez Simpson NP [facility] [phone] [id number] Follow-up Instructions Return if symptoms worsen or fail to improve. Upcoming Health Maintenance Date Due Pneumococcal 19-64 Medium Risk (1 of 1 - PPSV23) 10/25/1990 DTaP/Tdap/Td series (1 - Tdap) 10/25/1992 INFLUENZA AGE 9 TO ADULT 8/1/2016 PAP AKA CERVICAL CYTOLOGY 9/23/2018 Allergies as of 2/28/2017  Review Complete On: 2/28/2017 By: Caesar Rosales LPN Severity Noted Reaction Type Reactions Anesthetics - Niurka Type- Parabens  08/15/2016   Side Effect Itching Sulfa (Sulfonamide Antibiotics)  07/28/2014    Shortness of Breath Anesthetics - Amide Type Low 08/15/2016   Side Effect Itching Current Immunizations  Reviewed on 10/12/2015 Name Date Influenza Vaccine 10/12/2015, 1/9/2015 Not reviewed this visit You Were Diagnosed With   
  
 Codes Comments Asthma exacerbation    -  Primary ICD-10-CM: C43.854 ICD-9-CM: 845.90 Moderate episode of recurrent major depressive disorder (HCC)     ICD-10-CM: F33.1 ICD-9-CM: 296.32 Hypothyroidism, unspecified type     ICD-10-CM: E03.9 ICD-9-CM: 244.9 Nonintractable migraine, unspecified migraine type     ICD-10-CM: G43.009 ICD-9-CM: 346.10 Nail fungal infection     ICD-10-CM: B35.1 ICD-9-CM: 110.1 Ingrown toenail     ICD-10-CM: L60.0 ICD-9-CM: 703.0 Bronchitis     ICD-10-CM: J40 ICD-9-CM: 755 Vitals BP  
  
  
  
  
  
 115/77 (BP 1 Location: Right arm, BP Patient Position: Sitting) BMI and BSA Data Body Mass Index Body Surface Area  
 38.62 kg/m 2 2.1 m 2 Preferred Pharmacy Pharmacy Name Phone [facility] [address] [facility], [facility] [phone] Your Updated Medication List  
  
   
 This list is accurate as of: 2/28/17 11:30 AM.  Always use your most recent med list.  
  
  
  
  
 albuterol 90 mcg/actuation inhaler Commonly known as:  PROVENTIL HFA, VENTOLIN HFA, PROAIR HFA Take 2 Puffs by inhalation every four (4) hours as needed for Wheezing or Shortness of Breath. azithromycin 250 mg tablet Commonly known as:  Alejandra Mackay Take 2 tablets today, then take 1 tablet daily  
  
 chlorpheniramine-HYDROcodone 10-8 mg/5 mL suspension Commonly known as:  Sara Eulogio Take 5 mL by mouth every twelve (12) hours as needed for Cough. Max Daily Amount: 10 mL. cyclobenzaprine 10 mg tablet Commonly known as:  FLEXERIL Take 1 Tab by mouth three (3) times daily as needed for Muscle Spasm(s). ibuprofen 800 mg tablet Commonly known as:  MOTRIN Take 1 Tab by mouth three (3) times daily as needed for Pain.  
  
 levothyroxine 150 mcg tablet Commonly known as:  SYNTHROID Take 1 Tab by mouth Daily (before breakfast). LINZESS 145 mcg Cap capsule Generic drug:  linaclotide Take 145 mcg by mouth Daily (before breakfast). multivitamin tablet Commonly known as:  ONE A DAY Take 1 Tab by mouth daily. predniSONE 10 mg tablet Commonly known as:  Orlean Aas Take 4 tabs daily for 3 days, then 3 tabs daily for 3 days, then 2 tabs daily for 3 days, then 1 tab daily for 3 days SUMAtriptan 50 mg tablet Commonly known as:  IMITREX Take 1 Tab by mouth once as needed for Migraine for up to 1 dose. terbinafine HCl 250 mg tablet Commonly known as:  LAMISIL Take 1 Tab by mouth daily. venlafaxine- mg capsule Commonly known as:  EFFEXOR XR Take 1 Cap by mouth daily. ZOLMitriptan 5 mg tablet Commonly known as:  ZOMIG Take 1 Tab by mouth as needed for Migraine. Prescriptions Sent to Pharmacy  Refills  
 albuterol (PROVENTIL HFA, VENTOLIN HFA, PROAIR HFA) 90 mcg/actuation inhaler 3  
 Sig: Take 2 Puffs by inhalation every four (4) hours as needed for Wheezing or Shortness of Breath. Class: Normal  
 Pharm[facility]: [id number] Ph #: [phone] Route: Inhalation  
 azithromycin (ZITHROMAX) 250 mg tablet 0 Sig: Take 2 tablets today, then take 1 tablet daily Class: Normal  
 Pharmacy: [id number] [address] Ph #: [phone]  
 venlafaxine-SR (EFFEXOR-XR) 150 mg capsule 1 Sig: Take 1 Cap by mouth daily. Class: Normal  
 Pharmacy: [id number] [address] [employer] [facility], [facility] Ph #: [phone] Route: Oral  
  
Follow-up Instructions Return if symptoms worsen or fail to improve. Please provide this summary of care documentation to your next provider. Your primary care clinician is listed as Linh Lohn. If you have any questions after today's visit, please call [phone].

## 2017-02-28 NOTE — LETTER
NOTIFICATION RETURN TO WORK / SCHOOL 
 
2/28/2017 11:27 AM 
 
Ms. Андрей Riley 
[address] [facility] [address] To Whom It May Concern: 
 
Андрей Riley is currently under the care of [facility]. She was seen today in our office. Please excuse her from work- she will return back to work 3/2/2017. If there are questions or concerns please have the patient contact our office.  
 
 
 
Sincerely, 
 
 
Scarlet Holland, NP

## 2017-02-28 NOTE — PROGRESS NOTES
HISTORY OF PRESENT ILLNESS  Rohit Gonsales is a 40 y.o. female. 2/28/2017  11:22 AM  Chief Complaint   Patient presents with    Cough     on Prednisone and Tussinex  and not helping, coughing up of mucus with striks of blood       HPI: Here today for continued complaints of cough that has been happening for the last 2 weeks. Evaluated 2/20 by Rafal Alicia PA-C and felt to have an asthma exacerbation due to viral illness- placed on Tussionex and Prednisone. She reports that she has been taking as prescribed and has not been improving. Has been using Albuterol PRN. She reports cough productive of yellowish sputum with some streaks of blood intermittently. Denies any SOB and she denies any chest pain or back pain. No history or family history of blood clots. Has not been on ABs lately. Also needs refill on Effexor 150 mg that she takes for headaches and depression. Review of Systems   Constitutional: Negative for chills, fever and malaise/fatigue. HENT: Negative for congestion, ear pain and sore throat. Eyes: Negative for double vision, photophobia and pain. Respiratory: Positive for cough and sputum production. Negative for shortness of breath and wheezing. Cardiovascular: Negative for chest pain, palpitations and leg swelling. Gastrointestinal: Negative for abdominal pain, constipation, diarrhea, nausea and vomiting. Musculoskeletal: Negative for myalgias. Skin: Negative for rash. Neurological: Negative for dizziness, weakness and headaches.        PHQ Screening   PHQ 2 / 9, over the last two weeks 1/16/2017   Little interest or pleasure in doing things Not at all   Feeling down, depressed or hopeless Not at all   Total Score PHQ 2 0         History  Past Medical History:   Diagnosis Date    Asthma     Contact dermatitis and other eczema, due to unspecified cause     ezcema    Depression     Headache     migraines-[facility]    Heart murmur     HX OTHER MEDICAL     menieres disease    Hypothyroid     Nodule of vagina 7/2016    nodule vaginal cuff- repeat US in 3-6 mths    JOAN on CPAP     Thyroid disease     HYPOTHYROIDISM       Past Surgical History:   Procedure Laterality Date    ABDOMEN SURGERY PROC UNLISTED      HX CHOLECYSTECTOMY  08/2016    HX HYSTERECTOMY  2013    also uterine polyp    LAP,CHOLECYSTECTOMY N/A 08/15/2016    Dr. Sada Rosado History     Social History    Marital status:      Spouse name: N/A    Number of children: N/A    Years of education: N/A     Occupational History    Not on file. 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 1 Inhaler 3    venlafaxine-SR (EFFEXOR-XR) 150 mg capsule Take 1 Cap by mouth daily. 30 Cap 1    predniSONE (DELTASONE) 10 mg tablet Take 4 tabs daily for 3 days, then 3 tabs daily for 3 days, then 2 tabs daily for 3 days, then 1 tab daily for 3 days 30 Tab 0    cyclobenzaprine (FLEXERIL) 10 mg tablet Take 1 Tab by mouth three (3) times daily as needed for Muscle Spasm(s). 15 Tab 0    chlorpheniramine-HYDROcodone (TUSSIONEX) 10-8 mg/5 mL suspension Take 5 mL by mouth every twelve (12) hours as needed for Cough. Max Daily Amount: 10 mL.  [facility] mL 0    levothyroxine (SYNTHROID) 150 mcg tablet Take 1 Tab by mouth Daily (before breakfast). 30 Tab 5    SUMAtriptan (IMITREX) 50 mg tablet Take 1 Tab by mouth once as needed for Migraine for up to 1 dose. 10 Tab 3    ZOLMitriptan (ZOMIG) 5 mg tablet Take 1 Tab by mouth as needed for Migraine. 10 Tab 3    terbinafine HCl (LAMISIL) 250 mg tablet Take 1 Tab by mouth daily. 90 Tab 0    linaclotide (LINZESS) 145 mcg cap capsule Take 145 mcg by mouth Daily (before breakfast).  multivitamin (ONE A DAY) tablet Take 1 Tab by mouth daily.  ibuprofen (MOTRIN) 800 mg tablet Take 1 Tab by mouth three (3) times daily as needed for Pain. 40 Tab 0         Patient Care Team:  Patient Care Team:  Leonarda Kelsey MD as PCP - General (Family Practice)  Alexi Almazan MD (Neurology)  Barbara Schneider DO (Internal Medicine)        LABS:  None new to review    RADIOLOGY:  None new to review      Physical Exam   Constitutional: She is oriented to person, place, and time. She appears well-developed and well-nourished. No distress. HENT:   Head: Normocephalic. Right Ear: Tympanic membrane, external ear and ear canal normal.   Left Ear: Tympanic membrane, external ear and ear canal normal.   Nose: Mucosal edema present. No rhinorrhea. Right sinus exhibits no maxillary sinus tenderness and no frontal sinus tenderness. Left sinus exhibits no maxillary sinus tenderness and no frontal sinus tenderness. Mouth/Throat: Uvula is midline, oropharynx is clear and moist and mucous membranes are normal. No oropharyngeal exudate, posterior oropharyngeal edema or posterior oropharyngeal erythema. Eyes: EOM are normal. Pupils are equal, round, and reactive to light. Neck: Normal range of motion. Neck supple. Cardiovascular: Normal rate, regular rhythm and normal heart sounds. No murmur heard. Pulmonary/Chest: Effort normal and breath sounds normal. No respiratory distress. Abdominal: Soft.  Bowel sounds are normal. There is no tenderness. Lymphadenopathy:     She has no cervical adenopathy. Neurological: She is alert and oriented to person, place, and time. Skin: Skin is warm and dry. Psychiatric: Her speech is normal.        Vitals:    02/28/17 1108   BP: 115/77   Pulse: 82   Resp: 18   Temp: 98.4 °F (36.9 °C)   TempSrc: Oral   SpO2: 97%   Weight: 218 lb (98.9 kg)   Height: 5' 3\" (1.6 m)   PainSc:   0 - No pain       ASSESSMENT and PLAN  Thalia was seen today for cough. Diagnoses and all orders for this visit:    Asthma exacerbation/ Bronchitis  *Will start on AB therapy. Finish steroids as prescribed. Still has Tussionex available. Refilled on PRN Albuterol. *Discussed with patient about symptoms that would require an ER visit. Patient understands symptoms that are an emergency and will go to the ER if they occur.   -     albuterol (PROVENTIL HFA, VENTOLIN HFA, PROAIR HFA) 90 mcg/actuation inhaler; Take 2 Puffs by inhalation every four (4) hours as needed for Wheezing or Shortness of Breath.  -     azithromycin (ZITHROMAX) 250 mg tablet; Take 2 tablets today, then take 1 tablet daily    Moderate episode of recurrent major depressive disorder (HCC)/ Nonintractable migraine, unspecified migraine type  *Refilled. -     venlafaxine-SR (EFFEXOR-XR) 150 mg capsule; Take 1 Cap by mouth daily. *Plan of care reviewed with patient. Patient in agreement with plan and expresses understanding. All questions answered and patient encouraged to call or RTO if further questions or concerns. Follow-up Disposition:  Return if symptoms worsen or fail to improve.

## 2017-04-04 ENCOUNTER — OFFICE VISIT (OUTPATIENT)
Dept: FAMILY MEDICINE CLINIC | Age: 47
End: 2017-04-04

## 2017-04-04 VITALS
TEMPERATURE: 98.8 F | BODY MASS INDEX: 37.7 KG/M2 | OXYGEN SATURATION: 99 % | WEIGHT: 212.8 LBS | HEIGHT: 63 IN | HEART RATE: 84 BPM | RESPIRATION RATE: 18 BRPM | DIASTOLIC BLOOD PRESSURE: 76 MMHG | SYSTOLIC BLOOD PRESSURE: 126 MMHG

## 2017-04-04 DIAGNOSIS — Z76.0 MEDICATION REFILL: ICD-10-CM

## 2017-04-04 DIAGNOSIS — R58 ECCHYMOSIS: Primary | ICD-10-CM

## 2017-04-04 DIAGNOSIS — M79.604 RIGHT LEG PAIN: ICD-10-CM

## 2017-04-04 DIAGNOSIS — I87.8: ICD-10-CM

## 2017-04-04 RX ORDER — CYCLOBENZAPRINE HCL 10 MG
10 TABLET ORAL
Qty: 15 TAB | Refills: 0 | Status: SHIPPED | OUTPATIENT
Start: 2017-04-04

## 2017-04-05 ENCOUNTER — HOSPITAL ENCOUNTER (EMERGENCY)
Age: 47
Discharge: HOME | End: 2017-04-05
Attending: EMERGENCY MEDICINE
Payer: COMMERCIAL

## 2017-04-05 VITALS
RESPIRATION RATE: 18 BRPM | SYSTOLIC BLOOD PRESSURE: 134 MMHG | TEMPERATURE: 97.9 F | HEIGHT: 63 IN | DIASTOLIC BLOOD PRESSURE: 88 MMHG | HEART RATE: 80 BPM | WEIGHT: 210 LBS | OXYGEN SATURATION: 98 % | BODY MASS INDEX: 37.21 KG/M2

## 2017-04-05 DIAGNOSIS — I80.01 THROMBOPHLEBITIS OF SUPERFICIAL VEINS OF RIGHT LOWER EXTREMITY: Primary | ICD-10-CM

## 2017-04-05 PROCEDURE — 99283 EMERGENCY DEPT VISIT LOW MDM: CPT

## 2017-04-05 NOTE — PROGRESS NOTES
HISTORY OF PRESENT ILLNESS  Megan Brunner is a 40 y.o. female. 4/4/2017  5:10 PM    Chief Complaint   Patient presents with    Bleeding/Bruising     brusing on right outter thigh, denies any injury noticed it on 4/2/17 and not it is tender to touch    Rash     red raised rash on chest for two weeks, has nt changed anything or used anything different       HPI: Here today for complaints of bruising and rash. PCP Dr Zarina Garcias. Reports that she has a rash on chest that has been happening for about 2 weeks. Nothing seems to make rash better worse- has not tried anything to help. Not itchy. Also has a bruise on the right outer thigh that has been present for a few days, spreading a little more. Reports enlarged veins in area that have been worsening. Painful in the area. No leg swelling. She reports family history of DVT. Review of Systems   Constitutional: Negative for chills, fever and malaise/fatigue. Respiratory: Negative for cough, shortness of breath and wheezing. Cardiovascular: Negative for chest pain, palpitations and leg swelling. Gastrointestinal: Negative for abdominal pain, diarrhea, nausea and vomiting. Musculoskeletal:        +bruise, right leg pain   Skin: Positive for rash. Negative for itching. Neurological: Negative for dizziness and headaches.        PHQ Screening   PHQ 2 / 9, over the last two weeks 1/16/2017   Little interest or pleasure in doing things Not at all   Feeling down, depressed or hopeless Not at all   Total Score PHQ 2 0         History  Past Medical History:   Diagnosis Date    Asthma     Contact dermatitis and other eczema, due to unspecified cause     ezcema    Depression     Headache     migraines-[facility]    Heart murmur     HX OTHER MEDICAL     menieres disease    Hypothyroid     Nodule of vagina 7/2016    nodule vaginal cuff- repeat US in 3-6 mths    JOAN on CPAP     Thyroid disease     HYPOTHYROIDISM       Past Surgical History: Procedure Laterality Date    ABDOMEN SURGERY PROC UNLISTED      HX CHOLECYSTECTOMY  08/2016    HX HYSTERECTOMY  2013    also uterine polyp    LAP,CHOLECYSTECTOMY N/A 08/15/2016    Dr. Bettina Cyr History     Social History    Marital status:      Spouse name: N/A    Number of children: N/A    Years of education: N/A     Occupational History    Not on file. 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 15 Tab 0    albuterol (PROVENTIL HFA, VENTOLIN HFA, PROAIR HFA) 90 mcg/actuation inhaler Take 2 Puffs by inhalation every four (4) hours as needed for Wheezing or Shortness of Breath. 1 Inhaler 3    venlafaxine-SR (EFFEXOR-XR) 150 mg capsule Take 1 Cap by mouth daily. 30 Cap 1    chlorpheniramine-HYDROcodone (TUSSIONEX) 10-8 mg/5 mL suspension Take 5 mL by mouth every twelve (12) hours as needed for Cough. Max Daily Amount: 10 mL. 115 mL 0    levothyroxine (SYNTHROID) 150 mcg tablet Take 1 Tab by mouth Daily (before breakfast). 30 Tab 5    SUMAtriptan (IMITREX) 50 mg tablet Take 1 Tab by mouth once as needed for Migraine for up to 1 dose. 10 Tab 3    ZOLMitriptan (ZOMIG) 5 mg tablet Take 1 Tab by mouth as needed for Migraine. 10 Tab 3    terbinafine HCl (LAMISIL) 250 mg tablet Take 1 Tab by mouth daily.  90 Tab 0    multivitamin (ONE A DAY) tablet Take 1 Tab by mouth daily.  predniSONE (DELTASONE) 10 mg tablet Take 4 tabs daily for 3 days, then 3 tabs daily for 3 days, then 2 tabs daily for 3 days, then 1 tab daily for 3 days 30 Tab 0    linaclotide (LINZESS) 145 mcg cap capsule Take 145 mcg by mouth Daily (before breakfast).  ibuprofen (MOTRIN) 800 mg tablet Take 1 Tab by mouth three (3) times daily as needed for Pain. 40 Tab 0         Patient Care Team:  Patient Care Team:  Nika Fu MD as PCP - General (Family Practice)  Denisa Sampson MD (Neurology)  Alannah Ward DO (Internal Medicine)        LABS:  None new to review    RADIOLOGY:  None new to review      Physical Exam   Constitutional: She is oriented to person, place, and time. She appears well-developed and well-nourished. No distress. Neck: Normal range of motion. Neck supple. Pulmonary/Chest: Effort normal. No respiratory distress. Musculoskeletal: She exhibits no edema. Right upper leg: She exhibits tenderness. She exhibits no swelling and no edema. Legs:  -right upper thigh with bruising noted, TTP  -engorged veins around area of bruising   Neurological: She is alert and oriented to person, place, and time. She exhibits normal muscle tone. Coordination normal.   Skin: Skin is warm and dry. Vitals:    04/04/17 1722   BP: 126/76   Pulse: 84   Resp: 18   Temp: 98.8 °F (37.1 °C)   TempSrc: Oral   SpO2: 99%   Weight: 212 lb 12.8 oz (96.5 kg)   Height: 5' 3\" (1.6 m)   PainSc:   0 - No pain       ASSESSMENT and PLAN  Thalia was seen today for bleeding/bruising and rash. Diagnoses and all orders for this visit:    Ecchymosis/ Engorgement of vein/ Right leg pain  *Discussed with patient about possible causes. Recommend to r/o DVT at this time. PRN NSAID and Tylenol. *Discussed with patient about symptoms that would require an ER visit.  Patient understands symptoms that are an emergency and will go to the ER if they occur.   - DUPLEX LOWER EXT VENOUS RIGHT; Future    Medication refill  *Requests refill. Recommended to discuss with PCP if symptoms persist.  -     cyclobenzaprine (FLEXERIL) 10 mg tablet; Take 1 Tab by mouth three (3) times daily as needed for Muscle Spasm(s). *Plan of care reviewed with patient. Patient in agreement with plan and expresses understanding. All questions answered and patient encouraged to call or RTO if further questions or concerns. Follow-up Disposition:  Return if symptoms worsen or fail to improve.

## 2017-04-05 NOTE — DISCHARGE INSTRUCTIONS
Superficial Thrombophlebitis: Care Instructions  Your Care Instructions  Superficial thrombophlebitis is inflammation in a vein where a blood clot has formed close to the surface of the skin. You may be able to feel the clot as a firm lump under the skin. The skin over the clot can become red, tender, and warm to the touch. Blood clots in veins close to the skin's surface usually are not serious and often can be treated at home. Sometimes superficial thrombophlebitis spreads to a deeper vein (deep vein thrombosis, or DVT). These deeper clots can be serious, even life-threatening. It is very important that you follow your doctor's instructions, keep all follow-up appointments, and watch for new or worsening symptoms of a clot. Follow-up care is a key part of your treatment and safety. Be sure to make and go to all appointments, and call your doctor if you are having problems. It's also a good idea to know your test results and keep a list of the medicines you take. How can you care for yourself at home? · Take your medicines exactly as prescribed. Call your doctor if you think you are having a problem with your medicine. You will get more details on the specific medicines your doctor prescribes. · Prop up the sore leg or arm on a pillow anytime you sit or lie down. Try to keep it above the level of your heart. To prevent thrombophlebitis  · Exercise. Keep blood moving in your legs to keep new clots from forming. · Get up out of bed as soon as possible after an illness or surgery. · Do not smoke. If you need help quitting, talk to your doctor about stop-smoking programs and medicines. These can increase your chances of quitting for good. · Ask your doctor about compression stockings. These may help prevent blood clots from forming in your legs. You can buy these with a prescription at medical supply stores and some drugstores. When should you call for help?   Call 911 anytime you think you may need emergency care. For example, call if:  · You have sudden chest pain and shortness of breath, or you cough up blood. · You vomit blood or what looks like coffee grounds. · You pass maroon or very bloody stools. · You passed out (lost consciousness). Call your doctor now or seek immediate medical care if:  · You have signs of a blood clot, such as:  ¨ Pain in your calf, back of the knee, thigh, or groin. ¨ Redness and swelling in your leg or groin. · You notice a new hard, red, or tender area in your leg. · Your stools are black and tarlike or have streaks of blood. Watch closely for changes in your health, and be sure to contact your doctor if:  · You do not get better as expected. Where can you learn more? Go to http://nathaly-pj.info/. Enter [phone] in the search box to learn more about \"Superficial Thrombophlebitis: Care Instructions. \"  Current as of: June 4, 2016  Content Version: 11.2  © 3050-6264 [employer]. Care instructions adapted under license by [employer] (which disclaims liability or warranty for this information). If you have questions about a medical condition or this instruction, always ask your healthcare professional. [address] any warranty or liability for your use of this information.

## 2017-04-05 NOTE — ED TRIAGE NOTES
Patient with long bruise on right thight that she noticed on Sunday. Saw PMD yesterday, is scheduled for a Doppler study on Monday but felt numbness all the way down from knee down leg this morning so came to have eval done. Ambulates without difficulty.

## 2017-04-10 ENCOUNTER — HOSPITAL ENCOUNTER (OUTPATIENT)
Dept: VASCULAR SURGERY | Age: 47
Discharge: HOME | End: 2017-04-10
Attending: NURSE PRACTITIONER
Payer: COMMERCIAL

## 2017-04-10 DIAGNOSIS — I87.8: ICD-10-CM

## 2017-04-10 DIAGNOSIS — M79.604 RIGHT LEG PAIN: ICD-10-CM

## 2017-04-10 DIAGNOSIS — R58 ECCHYMOSIS: ICD-10-CM

## 2017-04-10 PROCEDURE — 93971 EXTREMITY STUDY: CPT

## 2017-04-10 NOTE — PROCEDURES
[facility]  *** [facility]AL [facility] ***    Name: Bonnie Shanks  MRN: [id number]    Outpatient  : 25 Oct 1970  HIS Order #: [id number]  [id number] [facility] Visit #: [id number]  Date: 10 Apr 2017    TYPE OF TEST: Peripheral Venous Testing    REASON FOR TEST  Pain in limb, Limb swelling    Right Leg:-  Deep venous thrombosis:           No  Superficial venous thrombosis:    No  Deep venous insufficiency:        Not examined  Superficial venous insufficiency: Not examined      INTERPRETATION/FINDINGS  Duplex images were obtained using 2-D gray scale, color flow, and  spectral Doppler analysis. Right leg :  1. Deep veins visualized include the common femoral, femoral,  popliteal, posterior tibial and peroneal veins. 2. No evidence of deep venous thrombosis detected in the veins  visualized. 3. No evidence of deep vein thrombosis in the contralateral common  femoral vein. 4. Superficial veins visualized include the great saphenous vein. 5. No evidence of superficial thrombosis detected. ADDITIONAL COMMENTS    I have personally reviewed the data relevant to the interpretation of  this  study. TECHNOLOGIST: JERROD Luis, STEVEN  Signed: 04/10/2017 06:44 PM    PHYSICIAN: Deonna Gutierrez D.O.   Signed: 04/10/2017 07:50 PM

## 2017-06-20 ENCOUNTER — OFFICE VISIT (OUTPATIENT)
Dept: FAMILY MEDICINE CLINIC | Age: 47
End: 2017-06-20

## 2017-06-20 VITALS
HEART RATE: 89 BPM | BODY MASS INDEX: 37.6 KG/M2 | WEIGHT: 212.2 LBS | SYSTOLIC BLOOD PRESSURE: 106 MMHG | TEMPERATURE: 98 F | DIASTOLIC BLOOD PRESSURE: 74 MMHG | RESPIRATION RATE: 18 BRPM | HEIGHT: 63 IN | OXYGEN SATURATION: 97 %

## 2017-06-20 DIAGNOSIS — E03.9 HYPOTHYROIDISM, UNSPECIFIED TYPE: Chronic | ICD-10-CM

## 2017-06-20 DIAGNOSIS — R21 RASH AND NONSPECIFIC SKIN ERUPTION: Primary | ICD-10-CM

## 2017-06-20 DIAGNOSIS — Z86.59 HISTORY OF DEPRESSION: ICD-10-CM

## 2017-06-20 RX ORDER — TRIAMCINOLONE ACETONIDE 1 MG/G
CREAM TOPICAL 2 TIMES DAILY
Qty: 45 G | Refills: 0 | Status: SHIPPED | OUTPATIENT
Start: 2017-06-20 | End: 2019-05-13

## 2017-06-20 RX ORDER — LEVOTHYROXINE SODIUM 150 UG/1
150 TABLET ORAL
Qty: 30 TAB | Refills: 5 | Status: SHIPPED | OUTPATIENT
Start: 2017-06-20 | End: 2018-03-14 | Stop reason: SDUPTHER

## 2017-06-20 RX ORDER — VENLAFAXINE HYDROCHLORIDE 37.5 MG/1
37.5 CAPSULE, EXTENDED RELEASE ORAL DAILY
Qty: 15 CAP | Refills: 0 | Status: SHIPPED | OUTPATIENT
Start: 2017-06-20 | End: 2017-07-19 | Stop reason: SDUPTHER

## 2017-06-20 NOTE — MR AVS SNAPSHOT
Visit Information Date & Time Provider Department Dept. Phone Encounter #  
 6/20/2017  4:45 PM Aiden Hogue [phone] [id number] Follow-up Instructions Return in about 6 months (around 12/20/2017) for follow up with your PCP. Jovana Fetnon Your Appointments 6/20/2017  4:45 PM  
FOLLOW UP EXAM with BARRINGTON Hogue ([facility]) Appt Note: RASH ON STOMACH AND WANTS TO BE TESTED FOR CELIAC  
 [address] [facility] [id number]  
[facility] [id number] Upcoming Health Maintenance Date Due Pneumococcal 19-64 Medium Risk (1 of 1 - PPSV23) 10/25/1990 DTaP/Tdap/Td series (1 - Tdap) 10/25/1992 INFLUENZA AGE 9 TO ADULT 8/1/2017 PAP AKA CERVICAL CYTOLOGY 9/23/2018 Allergies as of 6/20/2017  Review Complete On: 6/20/2017 By: Wisam Cruz LPN Severity Noted Reaction Type Reactions Anesthetics - Niurka Type- Parabens  08/15/2016   Side Effect Itching Sulfa (Sulfonamide Antibiotics)  07/28/2014    Shortness of Breath Anesthetics - Amide Type Low 08/15/2016   Side Effect Itching Current Immunizations  Reviewed on 10/12/2015 Name Date Influenza Vaccine 10/12/2015, 1/9/2015 Not reviewed this visit You Were Diagnosed With   
  
 Codes Comments Rash and nonspecific skin eruption    -  Primary ICD-10-CM: R21 
ICD-9-CM: 782.1 Hypothyroidism, unspecified type     ICD-10-CM: E03.9 ICD-9-CM: 147. 9 Vitals BP Pulse Temp Resp Height(growth percentile) Weight(growth percentile) 106/74 (BP 1 Location: Right arm, BP Patient Position: Sitting) 89 98 °F (36.7 °C) (Oral) 18 5' 3\" (1.6 m) 212 lb 3.2 oz (96.3 kg) SpO2 BMI OB Status Smoking Status 97% 37.59 kg/m2 Hysterectomy Never Smoker Vitals History BMI and BSA Data  Body Mass Index Body Surface Area  
 37.59 kg/m 2 2.07 m 2  
  
  
 Preferred Pharmacy Pharmacy Name Phone [address], [address] [facility] [address] [facility] [phone] Your Updated Medication List  
  
   
This list is accurate as of: 6/20/17  4:29 PM.  Always use your most recent med list.  
  
  
  
  
 albuterol 90 mcg/actuation inhaler Commonly known as:  PROVENTIL HFA, VENTOLIN HFA, PROAIR HFA Take 2 Puffs by inhalation every four (4) hours as needed for Wheezing or Shortness of Breath. cyclobenzaprine 10 mg tablet Commonly known as:  FLEXERIL Take 1 Tab by mouth three (3) times daily as needed for Muscle Spasm(s). levothyroxine 150 mcg tablet Commonly known as:  SYNTHROID Take 1 Tab by mouth Daily (before breakfast). multivitamin tablet Commonly known as:  ONE A DAY Take 1 Tab by mouth daily. SUMAtriptan 50 mg tablet Commonly known as:  IMITREX Take 1 Tab by mouth once as needed for Migraine for up to 1 dose. terbinafine HCl 250 mg tablet Commonly known as:  LAMISIL Take 1 Tab by mouth daily. triamcinolone acetonide 0.1 % topical cream  
Commonly known as:  KENALOG Apply  to affected area two (2) times a day. use thin layer  
  
 venlafaxine-SR 37.5 mg capsule Commonly known as:  EFFEXOR-XR Take 1 Cap by mouth daily. ZOLMitriptan 5 mg tablet Commonly known as:  ZOMIG Take 1 Tab by mouth as needed for Migraine. Prescriptions Sent to Pharmacy Refills  
 triamcinolone acetonide (KENALOG) 0.1 % topical cream 0 Sig: Apply  to affected area two (2) times a day. use thin layer Class: Normal  
 Pharmacy: [address] [place] [address] Ph #: [phone] Route: Topical  
 levothyroxine (SYNTHROID) 150 mcg tablet 5 Sig: Take 1 Tab by mouth Daily (before breakfast).   
 Class: Normal  
 Pharmacy: [place] [address] [address] [place] [address] Ph #: [phone] Route: Oral  
 venlafaxine-SR (EFFEXOR-XR) 37.5 mg capsule 0 Sig: Take 1 Cap by mouth daily. Class: Normal  
 Pharmacy: [address] [place] [address] Ph #: [phone] Route: Oral  
  
Follow-up Instructions Return in about 6 months (around 12/20/2017) for follow up with your PCP. Bridgett Cornejo Please provide this summary of care documentation to your next provider. Your primary care clinician is listed as Buck Berry. If you have any questions after today's visit, please call [phone].

## 2017-06-20 NOTE — PROGRESS NOTES
HISTORY OF PRESENT ILLNESS  Marjorie Connelly is a 40 y.o. female. 6/20/2017  4:21 PM    Chief Complaint   Patient presents with    Rash     red raised rash on left side that is very itchy, noticed it on 6/18/17, wants to be checked for Gluten allergy       HPI: Here today for complaints of rash. PCP Dr Salvador Saldivar. She reports itchy, raised rash noted to left side of lower abdomen that has been present for a few days. She denies any possible causes that she can think of. No changes in lotions, shampoos, soaps, etc. Has not been doing anything to help. Nothing makes better or worse. Would like to have testing done for Gluten allergy- finds that when she stops eating Gluten that she does not have headaches, has energy, and overall feels better. Does have an allergist- has not spoken to him about it. Also needs refill on Synthroid. She also states that she has been tapering off her Effexor- currently doing 75 mg daily. Has been feeling fine without any issues. Would like to come off Effexor all together. Review of Systems   Constitutional: Negative for chills, fever and malaise/fatigue. Respiratory: Negative for cough, shortness of breath and wheezing. Cardiovascular: Negative for chest pain, palpitations and leg swelling. Gastrointestinal: Negative for abdominal pain, diarrhea, nausea and vomiting. Skin: Positive for itching and rash. Neurological: Negative for dizziness and headaches.         PHQ Screening   PHQ over the last two weeks 1/16/2017   Little interest or pleasure in doing things Not at all   Feeling down, depressed or hopeless Not at all   Total Score PHQ 2 0         History  Past Medical History:   Diagnosis Date    Asthma     Contact dermatitis and other eczema, due to unspecified cause     ezcema    Depression     Headache     migraines-following with Neuro at [facility]    Heart murmur     HX OTHER MEDICAL     menieres disease    Hypothyroid     Nodule of vagina 7/2016    nodule vaginal cuff- repeat US in 3-6 mths    JOAN on CPAP     Thyroid disease     HYPOTHYROIDISM       Past Surgical History:   Procedure Laterality Date    ABDOMEN SURGERY PROC UNLISTED      HX CHOLECYSTECTOMY  08/2016    HX HYSTERECTOMY  2013    also uterine polyp    LAP,CHOLECYSTECTOMY N/A 08/15/2016    Dr. Len Spring History     Social History    Marital status:      Spouse name: N/A    Number of children: N/A    Years of education: N/A     Occupational History    Not on file. 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 15 Tab 0    albuterol (PROVENTIL HFA, VENTOLIN HFA, PROAIR HFA) 90 mcg/actuation inhaler Take 2 Puffs by inhalation every four (4) hours as needed for Wheezing or Shortness of Breath. 1 Inhaler 3    venlafaxine-SR (EFFEXOR-XR) 150 mg capsule Take 1 Cap by mouth daily. 30 Cap 1    levothyroxine (SYNTHROID) 150 mcg tablet Take 1 Tab by mouth Daily (before breakfast). 30 Tab 5    SUMAtriptan (IMITREX) 50 mg tablet Take 1 Tab by mouth once as needed for Migraine for up to 1 dose. 10 Tab 3    ZOLMitriptan (ZOMIG) 5 mg tablet Take 1 Tab by mouth as needed for Migraine. 10 Tab 3    terbinafine HCl (LAMISIL) 250 mg tablet Take 1 Tab by mouth daily.  90 Tab 0    multivitamin (ONE A DAY) tablet Take 1 Tab by mouth daily. Patient Care Team:  Patient Care Team:  Armand Khan MD as PCP - General (Family Practice)  Lovely Hernandez MD (Neurology)  Venkat Becerra DO (Internal Medicine)        LABS:    Lab Results   Component Value Date/Time    TSH 1.420 02/20/2017 12:00 AM    TSH 0.40 05/19/2016 01:30 PM         RADIOLOGY:  None new to review      Physical Exam   Constitutional: She is oriented to person, place, and time. She appears well-developed and well-nourished. No distress. Cardiovascular: Normal rate, regular rhythm and normal heart sounds. No murmur heard. Pulmonary/Chest: Effort normal and breath sounds normal. No respiratory distress. Abdominal: Soft. Bowel sounds are normal. There is no tenderness. Neurological: She is alert and oriented to person, place, and time. Skin: Skin is warm and dry. Rash noted. Rash is papular. Vitals:    06/20/17 1610   BP: 106/74   Pulse: 89   Resp: 18   Temp: 98 °F (36.7 °C)   TempSrc: Oral   SpO2: 97%   Weight: 212 lb 3.2 oz (96.3 kg)   Height: 5' 3\" (1.6 m)   PainSc:   0 - No pain       ASSESSMENT and PLAN  Thalia was seen today for rash. Diagnoses and all orders for this visit:    Rash and nonspecific skin eruption  *Discussed with patient. Appears to be a contact dermatitis- will do steroid cream. Advised on Gluten allergy that this could be tested through her allergist.   -     triamcinolone acetonide (KENALOG) 0.1 % topical cream; Apply  to affected area two (2) times a day. use thin layer    Hypothyroidism, unspecified type  *Refilled. -     levothyroxine (SYNTHROID) 150 mcg tablet; Take 1 Tab by mouth Daily (before breakfast). History of depression  *Will continue tapering process. Lower to 37.5 mg for a week or two then discontinue. -     venlafaxine-SR (EFFEXOR-XR) 37.5 mg capsule; Take 1 Cap by mouth daily. *Plan of care reviewed with patient. Patient in agreement with plan and expresses understanding.  All questions answered and patient encouraged to call or RTO if further questions or concerns. Follow-up Disposition:  Return in about 6 months (around 12/20/2017) for follow up with your PCP. Bridgett Cornejo

## 2017-07-12 ENCOUNTER — OFFICE VISIT (OUTPATIENT)
Dept: FAMILY MEDICINE CLINIC | Age: 47
End: 2017-07-12

## 2017-07-12 VITALS
RESPIRATION RATE: 18 BRPM | BODY MASS INDEX: 37.85 KG/M2 | HEART RATE: 79 BPM | TEMPERATURE: 98.1 F | SYSTOLIC BLOOD PRESSURE: 118 MMHG | WEIGHT: 213.6 LBS | DIASTOLIC BLOOD PRESSURE: 81 MMHG | HEIGHT: 63 IN | OXYGEN SATURATION: 96 %

## 2017-07-12 DIAGNOSIS — M25.562 ACUTE PAIN OF LEFT KNEE: Primary | ICD-10-CM

## 2017-07-12 DIAGNOSIS — R20.2 NUMBNESS AND TINGLING OF FOOT: ICD-10-CM

## 2017-07-12 DIAGNOSIS — R20.0 NUMBNESS AND TINGLING OF FOOT: ICD-10-CM

## 2017-07-12 LAB — HBA1C MFR BLD HPLC: 5.2 %

## 2017-07-12 NOTE — PATIENT INSTRUCTIONS
Knee Pain or Injury: Care Instructions  Your Care Instructions    Injuries are a common cause of knee problems. Sudden (acute) injuries may be caused by a direct blow to the knee. They can also be caused by abnormal twisting, bending, or falling on the knee. Pain, bruising, or swelling may be severe, and may start within minutes of the injury. Overuse is another cause of knee pain. Other causes are climbing stairs, kneeling, and other activities that use the knee. Everyday wear and tear, especially as you get older, also can cause knee pain. Rest, along with home treatment, often relieves pain and allows your knee to heal. If you have a serious knee injury, you may need tests and treatment. Follow-up care is a key part of your treatment and safety. Be sure to make and go to all appointments, and call your doctor if you are having problems. It's also a good idea to know your test results and keep a list of the medicines you take. How can you care for yourself at home? · Be safe with medicines. Read and follow all instructions on the label. ¨ If the doctor gave you a prescription medicine for pain, take it as prescribed. ¨ If you are not taking a prescription pain medicine, ask your doctor if you can take an over-the-counter medicine. · Rest and protect your knee. Take a break from any activity that may cause pain. · Put ice or a cold pack on your knee for 10 to 20 minutes at a time. Put a thin cloth between the ice and your skin. · Prop up a sore knee on a pillow when you ice it or anytime you sit or lie down for the next 3 days. Try to keep it above the level of your heart. This will help reduce swelling. · If your knee is not swollen, you can put moist heat, a heating pad, or a warm cloth on your knee. · If your doctor recommends an elastic bandage, sleeve, or other type of support for your knee, wear it as directed.   · Follow your doctor's instructions about how much weight you can put on your leg. Use a cane, crutches, or a walker as instructed. · Follow your doctor's instructions about activity during your healing process. If you can do mild exercise, slowly increase your activity. · Reach and stay at a healthy weight. Extra weight can strain the joints, especially the knees and hips, and make the pain worse. Losing even a few pounds may help. When should you call for help? Call 911 anytime you think you may need emergency care. For example, call if:  · You have symptoms of a blood clot in your lung (called a pulmonary embolism). These may include:  ¨ Sudden chest pain. ¨ Trouble breathing. ¨ Coughing up blood. Call your doctor now or seek immediate medical care if:  · You have severe or increasing pain. · Your leg or foot turns cold or changes color. · You cannot stand or put weight on your knee. · Your knee looks twisted or bent out of shape. · You cannot move your knee. · You have signs of infection, such as:  ¨ Increased pain, swelling, warmth, or redness. ¨ Red streaks leading from the knee. ¨ Pus draining from a place on your knee. ¨ A fever. · You have signs of a blood clot in your leg (called a deep vein thrombosis), such as:  ¨ Pain in your calf, back of the knee, thigh, or groin. ¨ Redness and swelling in your leg or groin. Watch closely for changes in your health, and be sure to contact your doctor if:  · You have tingling, weakness, or numbness in your knee. · You have any new symptoms, such as swelling. · You have bruises from a knee injury that last longer than 2 weeks. · You do not get better as expected. Where can you learn more? Go to http://nathaly-jp.info/. Enter K195 in the search box to learn more about \"Knee Pain or Injury: Care Instructions. \"  Current as of: March 20, 2017  Content Version: 11.3  © 3934-0017 [employer].  Care instructions adapted under license by [employer] (which disclaims liability or warranty for this information). If you have questions about a medical condition or this instruction, always ask your healthcare professional. [place] [address] any warranty or liability for your use of this information.

## 2017-07-19 DIAGNOSIS — F33.9 EPISODE OF RECURRENT MAJOR DEPRESSIVE DISORDER, UNSPECIFIED DEPRESSION EPISODE SEVERITY (HCC): Primary | ICD-10-CM

## 2017-07-19 DIAGNOSIS — Z86.59 HISTORY OF DEPRESSION: ICD-10-CM

## 2017-07-19 RX ORDER — VENLAFAXINE HYDROCHLORIDE 37.5 MG/1
37.5 CAPSULE, EXTENDED RELEASE ORAL DAILY
Qty: 60 CAP | Refills: 0 | Status: SHIPPED | OUTPATIENT
Start: 2017-07-19 | End: 2017-07-20 | Stop reason: SDUPTHER

## 2017-07-19 NOTE — TELEPHONE ENCOUNTER
Requested Prescriptions     Pending Prescriptions Disp Refills    venlafaxine-SR (EFFEXOR-XR) 37.5 mg capsule 15 Cap 0     Sig: Take 1 Cap by mouth daily. Patient states that she attempted to wean herself off of the medication but after doing so she had thoughts of harming herself.

## 2017-07-19 NOTE — TELEPHONE ENCOUNTER
7/19/2017  5:02 PM    Chief Complaint   Patient presents with    Medication Refill        of patient in office requesting refill for patient. Reports that she is requesting to be back on medication due to depression and thoughts of hurting self.  reports that she is safe and has not been attempting to hurt herself. Understands when to take to ER. Restart on Effexor. Will check in on patient in about 1-2 weeks, needs office appointment.

## 2017-07-20 ENCOUNTER — OFFICE VISIT (OUTPATIENT)
Dept: FAMILY MEDICINE CLINIC | Age: 47
End: 2017-07-20

## 2017-07-20 ENCOUNTER — DOCUMENTATION ONLY (OUTPATIENT)
Dept: FAMILY MEDICINE CLINIC | Age: 47
End: 2017-07-20

## 2017-07-20 VITALS
RESPIRATION RATE: 18 BRPM | WEIGHT: 211.4 LBS | BODY MASS INDEX: 37.46 KG/M2 | DIASTOLIC BLOOD PRESSURE: 86 MMHG | HEIGHT: 63 IN | TEMPERATURE: 98.5 F | SYSTOLIC BLOOD PRESSURE: 126 MMHG | OXYGEN SATURATION: 92 % | HEART RATE: 90 BPM

## 2017-07-20 DIAGNOSIS — F33.9 EPISODE OF RECURRENT MAJOR DEPRESSIVE DISORDER, UNSPECIFIED DEPRESSION EPISODE SEVERITY (HCC): ICD-10-CM

## 2017-07-20 RX ORDER — VENLAFAXINE HYDROCHLORIDE 75 MG/1
75 CAPSULE, EXTENDED RELEASE ORAL DAILY
Qty: 30 CAP | Refills: 0 | Status: SHIPPED | OUTPATIENT
Start: 2017-07-20 | End: 2017-07-28 | Stop reason: SDUPTHER

## 2017-07-20 RX ORDER — VENLAFAXINE HYDROCHLORIDE 37.5 MG/1
CAPSULE, EXTENDED RELEASE ORAL
Qty: 15 CAP | Refills: 0 | OUTPATIENT
Start: 2017-07-20

## 2017-07-20 NOTE — PROGRESS NOTES
Patient called and stated she needed to be seen by Colona today or tomorrow. She states medication is giving her severe headaches, vertigo, and making her feel like she \"doesn't want to be a part of this world. \" Patient is scheduled to see Colona tomorrow (7/21/17) @ 9:45.

## 2017-07-20 NOTE — TELEPHONE ENCOUNTER
Return call made to pt. Pt advised me that she was really upset and felt like our office had brushed off her comments about \"wanting to harm herself\". I apologized to patient and advised her that our intentions is never to brush our patients off, we take comments as such very serious and we want to do everything in our power to assist her. I did advise pt that we only have 1 provider in the office today and she is completely booked, however she could be seen at the ER, pt declined the ER and stated that she is feeling much better today. I offered patient a same day appt with Tee Stringer NP, but I explained to patient that Tee Stringer NP is in our [facility] office today. Patient stated that would be fine but the appt would need to be after 4pm when her  gets off work. I called the [facility] office and spoke with Ester Martínez who confirmed that Tee Stringer has a 5pm today. Patient stated that time was perfect. I gave patient address and phone number for the [facility] office.

## 2017-07-20 NOTE — PROGRESS NOTES
Chief Complaint   Patient presents with    Depression     EVALUATION      1. Have you been to the ER, urgent care clinic since your last visit? Hospitalized since your last visit? No    2. Have you seen or consulted any other health care providers outside of the [address] [place] [address] since your last visit? Include any pap smears or colon screening.  No

## 2017-07-25 NOTE — PROGRESS NOTES
HISTORY OF PRESENT ILLNESS  Erik Cummings is a 40 y.o. female. 7/20/2017  5:05 PM    Chief Complaint   Patient presents with    Depression     EVALUATION        HPI: Here today for depression follow up- called into office yesterday requesting a refill on Effexor because when she self-weaned off of it, she began having thoughts of hurting herself. During last office visit, she had expressed desire to wean off medication because she was in a good place and felt it was time. Weaned slowly down to dosing and discontinued medication recently. Since then, she states that she has had thoughts of death and reports thoughts of killing herself via affixation. She was sent in an Rx yesterday and she states that she has gotten restarted on the medication yesterday and today- has noticed a minimal difference. She did not seek care in ER- declined to go.  present with patient who reports that he has been watching her closely and helping her through this time. Today, she reports feeling fair, she states that she is feeling slightly more optimistic. Review of Systems   Constitutional: Positive for malaise/fatigue. Neurological: Negative for weakness. Psychiatric/Behavioral: Positive for depression and suicidal ideas. Negative for hallucinations and substance abuse. The patient has insomnia. The patient is not nervous/anxious.         PHQ Screening   PHQ over the last two weeks 7/20/2017   Little interest or pleasure in doing things Nearly every day   Feeling down, depressed or hopeless Nearly every day   Total Score PHQ 2 6   Trouble falling or staying asleep, or sleeping too much Nearly every day   Feeling tired or having little energy Nearly every day   Poor appetite or overeating More than half the days   Feeling bad about yourself - or that you are a failure or have let yourself or your family down Nearly every day   Trouble concentrating on things such as school, work, reading or watching TV Nearly every day   Moving or speaking so slowly that other people could have noticed; or the opposite being so fidgety that others notice Nearly every day   Thoughts of being better off dead, or hurting yourself in some way Nearly every day   PHQ 9 Score 26   How difficult have these problems made it for you to do your work, take care of your home and get along with others Extremely difficult     *Depression screen positive, patient instructed to schedule follow-up visit at this practice and medication was prescribed, drug therapy education given. History  Past Medical History:   Diagnosis Date    Asthma     Contact dermatitis and other eczema, due to unspecified cause     ezcema    Depression     Headache     migraines-[facility]    Heart murmur     HX OTHER MEDICAL     menieres disease    Hypothyroid     Nodule of vagina 7/2016    nodule vaginal cuff- repeat US in 3-6 mths    JOAN on CPAP     Thyroid disease     HYPOTHYROIDISM       Past Surgical History:   Procedure Laterality Date    ABDOMEN SURGERY PROC UNLISTED      HX CHOLECYSTECTOMY  08/2016    HX HYSTERECTOMY  2013    also uterine polyp    LAP,CHOLECYSTECTOMY N/A 08/15/2016    Dr. Claribel Ortiz       Social History     Social History    Marital status:      Spouse name: N/A    Number of children: N/A    Years of education: N/A     Occupational History    Not on file. 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 30 Cap 0    triamcinolone acetonide (KENALOG) 0.1 % topical cream Apply  to affected area two (2) times a day. use thin layer 45 g 0    levothyroxine (SYNTHROID) 150 mcg tablet Take 1 Tab by mouth Daily (before breakfast). 30 Tab 5    cyclobenzaprine (FLEXERIL) 10 mg tablet Take 1 Tab by mouth three (3) times daily as needed for Muscle Spasm(s). 15 Tab 0    albuterol (PROVENTIL HFA, VENTOLIN HFA, PROAIR HFA) 90 mcg/actuation inhaler Take 2 Puffs by inhalation every four (4) hours as needed for Wheezing or Shortness of Breath. 1 Inhaler 3    SUMAtriptan (IMITREX) 50 mg tablet Take 1 Tab by mouth once as needed for Migraine for up to 1 dose. 10 Tab 3    ZOLMitriptan (ZOMIG) 5 mg tablet Take 1 Tab by mouth as needed for Migraine. 10 Tab 3    terbinafine HCl (LAMISIL) 250 mg tablet Take 1 Tab by mouth daily. 90 Tab 0    multivitamin (ONE A DAY) tablet Take 1 Tab by mouth daily. Patient Care Team:  Patient Care Team:  Brianna Camejo MD as PCP - General (Family Practice)  Aixa Hooks MD (Neurology)  Verna Baumann DO (Internal Medicine)        LABS:  None new to review    RADIOLOGY:  None new to review      Physical Exam   Constitutional: She is oriented to person, place, and time. She appears well-developed and well-nourished. No distress. Pulmonary/Chest: Effort normal. No respiratory distress. Neurological: She is alert and oriented to person, place, and time. She exhibits normal muscle tone. Coordination normal.   Skin: Skin is warm and dry. Psychiatric: Her speech is normal and behavior is normal. Judgment normal. Her mood appears not anxious. Cognition and memory are normal. She exhibits a depressed mood. She expresses suicidal ideation. She expresses no homicidal ideation. She expresses suicidal plans. She expresses no homicidal plans.         Vitals:    07/20/17 1656   BP: 126/86 Pulse: 90   Resp: 18   Temp: 98.5 °F (36.9 °C)   TempSrc: Oral   SpO2: 92%   Weight: 211 lb 6.4 oz (95.9 kg)   Height: 5' 3\" (1.6 m)   PainSc:   0 - No pain       ASSESSMENT and PLAN  Thalia was seen today for depression. Diagnoses and all orders for this visit:    Episode of recurrent major depressive disorder, unspecified depression episode severity ([address]  *She has improved minimally.  engaged in care. Patient and  are not wanting to report to ER for intervention and accept risks associated with outpatient treatment. *Will increase on Effexor. Follow up in one week. *Discussed with patient about symptoms that would require an ER visit. Patient understands symptoms that are an emergency and will go to the ER if they occur.   -     venlafaxine-SR (EFFEXOR-XR) 75 mg capsule; Take 1 Cap by mouth daily. *A total of 25 minutes was spent with the patient of which >50% was spent in counseling/coordinating care regarding depression, suicide prevention, medication management, and a safety plan. Follow-up Disposition:  Return in about 1 week (around 7/27/2017) for follow up on complaints.

## 2017-07-27 ENCOUNTER — TELEPHONE (OUTPATIENT)
Dept: FAMILY MEDICINE CLINIC | Age: 47
End: 2017-07-27

## 2017-07-27 NOTE — TELEPHONE ENCOUNTER
7/27/2017  12:05 PM    Chief Complaint   Patient presents with    Visit Follow-up Call       Patient evaluated last week for depression and SI. Attempted to contact patient to follow up on our visit and to see how she is feeling. She was increased up on Effexor to 75 mg daily last week. LM requesting call back.

## 2017-07-28 ENCOUNTER — OFFICE VISIT (OUTPATIENT)
Dept: FAMILY MEDICINE CLINIC | Age: 47
End: 2017-07-28

## 2017-07-28 VITALS
TEMPERATURE: 97.4 F | BODY MASS INDEX: 37.95 KG/M2 | RESPIRATION RATE: 18 BRPM | DIASTOLIC BLOOD PRESSURE: 77 MMHG | SYSTOLIC BLOOD PRESSURE: 115 MMHG | HEART RATE: 89 BPM | WEIGHT: 214.2 LBS | OXYGEN SATURATION: 97 % | HEIGHT: 63 IN

## 2017-07-28 DIAGNOSIS — G43.009 NONINTRACTABLE MIGRAINE, UNSPECIFIED MIGRAINE TYPE: ICD-10-CM

## 2017-07-28 DIAGNOSIS — F33.9 EPISODE OF RECURRENT MAJOR DEPRESSIVE DISORDER, UNSPECIFIED DEPRESSION EPISODE SEVERITY (HCC): Primary | ICD-10-CM

## 2017-07-28 RX ORDER — SUMATRIPTAN 50 MG/1
50 TABLET, FILM COATED ORAL
Qty: 10 TAB | Refills: 3 | Status: SHIPPED | OUTPATIENT
Start: 2017-07-28

## 2017-07-28 RX ORDER — ZOLMITRIPTAN 5 MG/1
5 TABLET, FILM COATED ORAL AS NEEDED
Qty: 10 TAB | Refills: 3 | Status: SHIPPED | OUTPATIENT
Start: 2017-07-28 | End: 2017-11-20 | Stop reason: SDUPTHER

## 2017-07-28 RX ORDER — VENLAFAXINE HYDROCHLORIDE 150 MG/1
150 CAPSULE, EXTENDED RELEASE ORAL DAILY
Qty: 90 CAP | Refills: 0 | Status: SHIPPED | OUTPATIENT
Start: 2017-07-28 | End: 2017-10-19 | Stop reason: SDUPTHER

## 2017-07-28 NOTE — PROGRESS NOTES
HISTORY OF PRESENT ILLNESS  Erik Cummings is a 40 y.o. female. 7/28/2017  4:57 PM    Chief Complaint   Patient presents with    Follow-up     Depression       HPI: Here today for depression follow up. Restarted on Effexor about a week ago due to recurrence in depression and feelings of SI when she discontinued it. She reports much improvement in symptoms but still having some anxiety. No further thoughts of SI. Having some headaches since starting back on Effexor- needs refill on Imitrex and Zomig that she uses PRN. Review of Systems   Constitutional: Negative for malaise/fatigue. Neurological: Positive for headaches. Negative for weakness. Psychiatric/Behavioral: Positive for depression. Negative for hallucinations, substance abuse and suicidal ideas. The patient is nervous/anxious. The patient does not have insomnia.         PHQ Screening   PHQ over the last two weeks 7/28/2017   PHQ Not Done Active Diagnosis of Depression or Bipolar Disorder   Little interest or pleasure in doing things -   Feeling down, depressed or hopeless -   Total Score PHQ 2 -   Trouble falling or staying asleep, or sleeping too much -   Feeling tired or having little energy -   Poor appetite or overeating -   Feeling bad about yourself - or that you are a failure or have let yourself or your family down -   Trouble concentrating on things such as school, work, reading or watching TV -   Moving or speaking so slowly that other people could have noticed; or the opposite being so fidgety that others notice -   Thoughts of being better off dead, or hurting yourself in some way -   PHQ 9 Score -   How difficult have these problems made it for you to do your work, take care of your home and get along with others -         History  Past Medical History:   Diagnosis Date    Asthma     Contact dermatitis and other eczema, due to unspecified cause     ezcema    Depression     Headache     migraines-[facility] with Neuro at [facility]  Heart murmur     HX OTHER MEDICAL     menieres disease    Hypothyroid     Nodule of vagina 7/2016    nodule vaginal cuff- repeat US in 3-6 mths    JOAN on CPAP     Thyroid disease     HYPOTHYROIDISM       Past Surgical History:   Procedure Laterality Date    ABDOMEN SURGERY PROC UNLISTED      HX CHOLECYSTECTOMY  08/2016    HX HYSTERECTOMY  2013    also uterine polyp    LAP,CHOLECYSTECTOMY N/A 08/15/2016    Dr. Uvaldo Hernandes History     Social History    Marital status:      Spouse name: N/A    Number of children: N/A    Years of education: N/A     Occupational History    Not on file. 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 30 Cap 0    triamcinolone acetonide (KENALOG) 0.1 % topical cream Apply  to affected area two (2) times a day. use thin layer 45 g 0    levothyroxine (SYNTHROID) 150 mcg tablet Take 1 Tab by mouth Daily (before breakfast). 30 Tab 5    cyclobenzaprine (FLEXERIL) 10 mg tablet Take 1 Tab by mouth three (3) times daily as needed for Muscle Spasm(s). 15 Tab 0    albuterol (PROVENTIL HFA, VENTOLIN HFA, PROAIR HFA) 90 mcg/actuation inhaler Take 2 Puffs by inhalation every four (4) hours as needed for Wheezing or Shortness of Breath. 1 Inhaler 3    SUMAtriptan (IMITREX) 50 mg tablet Take 1 Tab by mouth once as needed for Migraine for up to 1 dose. 10 Tab 3    ZOLMitriptan (ZOMIG) 5 mg tablet Take 1 Tab by mouth as needed for Migraine. 10 Tab 3    terbinafine HCl (LAMISIL) 250 mg tablet Take 1 Tab by mouth daily. 90 Tab 0    multivitamin (ONE A DAY) tablet Take 1 Tab by mouth daily. Patient Care Team:  Patient Care Team:  Sergei Mcdonough MD as PCP - General (Family Practice)  Norah Montoya MD (Neurology)  Angélica Rosario DO (Internal Medicine)        LABS:  None new to review    RADIOLOGY:  None new to review      Physical Exam   Constitutional: She is oriented to person, place, and time. She appears well-developed and well-nourished. No distress. Pulmonary/Chest: Effort normal. No respiratory distress. Neurological: She is alert and oriented to person, place, and time. She exhibits normal muscle tone. Coordination normal.   Skin: Skin is warm and dry. Psychiatric: Her speech is normal and behavior is normal. Judgment normal. Her mood appears not anxious. Cognition and memory are normal. She does not exhibit a depressed mood. She expresses no homicidal and no suicidal ideation. She expresses no suicidal plans and no homicidal plans. Vitals:    07/28/17 1654   BP: 115/77   Pulse: 89   Resp: 18   Temp: 97.4 °F (36.3 °C)   TempSrc: Oral   SpO2: 97%   Weight: 214 lb 3.2 oz (97.2 kg)   Height: 5' 3\" (1.6 m)   PainSc:   6   PainLoc: Head       ASSESSMENT and PLAN  Thalia was seen today for depression. Diagnoses and all orders for this visit:    Episode of recurrent major depressive disorder, unspecified depression episode severity ([facility] [address]  *Improved today- joking with  upon entrance to room. Smiling during office visit. She opts to increase up to 150 mg as previously on.   *Discussed with patient about symptoms that would require an ER visit. Patient understands symptoms that are an emergency and will go to the ER if they occur.   - venlafaxine-SR (EFFEXOR-XR) 150 mg capsule; Take 1 Cap by mouth daily. Dispense: 90 Cap;  Refill: 0    Nonintractable migraine, unspecified migraine type  *Refilled. - ZOLMitriptan (ZOMIG) 5 mg tablet; Take 1 Tab by mouth as needed for Migraine. Dispense: 10 Tab; Refill: 3  - SUMAtriptan (IMITREX) 50 mg tablet; Take 1 Tab by mouth once as needed for Migraine for up to 1 dose. Dispense: 10 Tab; Refill: 3      *Plan of care reviewed with patient. Patient in agreement with plan and expresses understanding. All questions answered and patient encouraged to call or RTO if further questions or concerns. Follow-up Disposition:  Return for already scheduled appointment. Ruth Espitia

## 2017-08-21 ENCOUNTER — TELEPHONE (OUTPATIENT)
Dept: FAMILY MEDICINE CLINIC | Age: 47
End: 2017-08-21

## 2017-08-21 NOTE — TELEPHONE ENCOUNTER
Patient is requesting that her referral to Asthma and Allergy be backdated to 8/18/17. I explained to patient that I could not guarantee it could be done and I would forward to our referral coordinator.

## 2017-10-19 DIAGNOSIS — F33.9 EPISODE OF RECURRENT MAJOR DEPRESSIVE DISORDER, UNSPECIFIED DEPRESSION EPISODE SEVERITY (HCC): ICD-10-CM

## 2017-10-19 RX ORDER — VENLAFAXINE HYDROCHLORIDE 150 MG/1
CAPSULE, EXTENDED RELEASE ORAL
Qty: 90 CAP | Refills: 0 | Status: SHIPPED | OUTPATIENT
Start: 2017-10-19

## 2017-10-19 NOTE — TELEPHONE ENCOUNTER
10/19/2017  8:46 AM    Chief Complaint   Patient presents with    Medication Refill       Noted refill request for Effexor. PCP Dr Bing Nielsen but last seen by me and restarted on medication. Upcoming appointment 12/2017. Refill completed.

## 2017-11-20 DIAGNOSIS — G43.009 NONINTRACTABLE MIGRAINE, UNSPECIFIED MIGRAINE TYPE: ICD-10-CM

## 2017-11-20 RX ORDER — ZOLMITRIPTAN 5 MG/1
5 TABLET, FILM COATED ORAL AS NEEDED
Qty: 10 TAB | Refills: 3 | Status: SHIPPED | OUTPATIENT
Start: 2017-11-20

## 2017-11-20 NOTE — TELEPHONE ENCOUNTER
Received a faxed refill request from [facility] for a refill on Zolmitriptan 5 mg tablets.     Patient's last OV: 7//  Patient's next OV: not scheduled  Medication(s) last filled on: 7/28/17

## 2017-11-29 ENCOUNTER — TELEPHONE (OUTPATIENT)
Dept: FAMILY MEDICINE CLINIC | Age: 47
End: 2017-11-29

## 2017-11-29 NOTE — TELEPHONE ENCOUNTER
Sumatriptan (Imitrex) is a similar medication and this is also on her med list. She could take this instead. She probably should not be taking both together, anyway. I can send a refill if needed.

## 2017-11-29 NOTE — TELEPHONE ENCOUNTER
Received a fax from [facility] requesting a drug change for pt for medication Zolmitriptan 5 mg insurance doesn't cover this medication. Plan Alternative includes Naratriptan HCL .

## 2018-03-14 DIAGNOSIS — E03.9 HYPOTHYROIDISM, UNSPECIFIED TYPE: Chronic | ICD-10-CM

## 2018-03-14 RX ORDER — LEVOTHYROXINE SODIUM 150 UG/1
TABLET ORAL
Qty: 90 TAB | Refills: 0 | OUTPATIENT
Start: 2018-03-14

## 2018-03-14 RX ORDER — LEVOTHYROXINE SODIUM 150 UG/1
150 TABLET ORAL
Qty: 30 TAB | Refills: 0 | Status: SHIPPED | OUTPATIENT
Start: 2018-03-14

## 2018-03-14 NOTE — TELEPHONE ENCOUNTER
Call made to pt's number [phone]. Number not a working number. Call made to emergency number listed as her  [phone]. No answer at this time. Left message that this call was in reference to Mrs. Clemente Choudhary and her medication refills, to give our office a call at [phone].

## 2018-03-14 NOTE — TELEPHONE ENCOUNTER
3/14/2018  1:48 PM    Chief Complaint   Patient presents with    Medication Refill       Noted request for 90 day supply on Synthroid per pharmacy. Declined. Needs appointment as per previous encounter.

## 2018-03-14 NOTE — TELEPHONE ENCOUNTER
3/14/2018  1:36 PM    Chief Complaint   Patient presents with    Medication Refill       Noted refill request for Synthroid. Last evaluated at [facility] by me on 7/2017- has not seen any other providers in the system. Patient cancelled 12/2017 office visit with Dr Mark Emmanuel. Refill completed for 30 days. Forwarded to clinical staff to make patient aware. No PCP marked in chart at this darren; however, previously being seen by me. Patient will need to be made aware of my transition to [facility] and given the option of where to scheduled.          Lab Results   Component Value Date/Time    TSH 1.420 02/20/2017 12:00 AM    TSH 0.40 05/19/2016 01:30 PM

## 2018-04-12 DIAGNOSIS — E03.9 HYPOTHYROIDISM, UNSPECIFIED TYPE: Chronic | ICD-10-CM

## 2018-04-12 NOTE — LETTER
4/17/2018 12:56 PM 
 
Ms. Luzmaria Garcia 
[address] [facility] [id number] We have been trying to get in touch with you regarding an appointment. This appointment is recommended to follow up on chronic conditions and medication refills. Since you were last seen in the office, I have relocated to [facility] and am no longer seeing patients at [facility]. You are more than welcome to transition care to another provider at the [place] location, or you can continue your care with me at the [place] location. Please call [phone] to be scheduled with me at [facility] or call [phone] to be scheduled with another provider at [facility].   
 
 
 
 
Sincerely, 
 
 
Michael Reno NP

## 2018-04-17 RX ORDER — LEVOTHYROXINE SODIUM 150 UG/1
TABLET ORAL
Qty: 30 TAB | Refills: 0 | OUTPATIENT
Start: 2018-04-17

## 2018-04-17 NOTE — TELEPHONE ENCOUNTER
4/17/2018  12:54 PM    Chief Complaint   Patient presents with    Medication Refill       Noted refill request for Synthroid. Last evaluated at [facility] by me on 7/2017- has not seen any other providers in the system. Patient cancelled 12/2017 office visit with Dr Isma Ye. Refill completed for 30 days 3/14/18 and message was left on 3/14 regarding need for appointment. Still no appointment noted. Refill declined. Letter sent.

## 2019-05-02 ENCOUNTER — CLINICAL SUPPORT (OUTPATIENT)
Dept: FAMILY MEDICINE CLINIC | Age: 49
End: 2019-05-02

## 2019-05-02 DIAGNOSIS — E66.9 OBESITY, UNSPECIFIED CLASSIFICATION, UNSPECIFIED OBESITY TYPE, UNSPECIFIED WHETHER SERIOUS COMORBIDITY PRESENT: Primary | ICD-10-CM

## 2019-05-02 NOTE — PROGRESS NOTES
Patient attended a Medically Supervised Weight Loss New Patient Orientation today where we discussed:  - New Direction Very Low Calorie Diet details  - Medical Supervision  - Nutrition education  - Cost of Meal Replacements  - Policies and compliance required for program enrollment.      Patients initial consultation with provider is tentatively scheduled for:  Future Appointments   Date Time Provider Lidia Rdz   5/13/2019  2:00 PM Marizol Anthony PA-C [address] [place] [address]

## 2019-05-06 DIAGNOSIS — E66.01 MORBID OBESITY (HCC): Primary | ICD-10-CM

## 2019-05-11 ENCOUNTER — HOSPITAL ENCOUNTER (OUTPATIENT)
Dept: LAB | Age: 49
Discharge: HOME | End: 2019-05-11
Payer: OTHER GOVERNMENT

## 2019-05-11 DIAGNOSIS — E66.01 MORBID OBESITY (HCC): ICD-10-CM

## 2019-05-11 LAB
ALBUMIN SERPL-MCNC: 3.8 G/DL (ref 3.4–5)
ALBUMIN/GLOB SERPL: 1.2 {RATIO} (ref 0.8–1.7)
ALP SERPL-CCNC: 105 U/L (ref 45–117)
ALT SERPL-CCNC: 79 U/L (ref 13–56)
ANION GAP SERPL CALC-SCNC: 4 MMOL/L (ref 3–18)
APPEARANCE UR: ABNORMAL
AST SERPL-CCNC: 77 U/L (ref 15–37)
ATRIAL RATE: 72 BPM
BACTERIA URNS QL MICRO: ABNORMAL /HPF
BASOPHILS # BLD: 0 K/UL (ref 0–0.1)
BASOPHILS NFR BLD: 0 % (ref 0–2)
BILIRUB SERPL-MCNC: 0.3 MG/DL (ref 0.2–1)
BILIRUB UR QL: NEGATIVE
BUN SERPL-MCNC: 11 MG/DL (ref 7–18)
BUN/CREAT SERPL: 14 (ref 12–20)
CALCIUM SERPL-MCNC: 8.8 MG/DL (ref 8.5–10.1)
CALCULATED P AXIS, ECG09: 31 DEGREES
CALCULATED R AXIS, ECG10: -18 DEGREES
CALCULATED T AXIS, ECG11: 7 DEGREES
CHLORIDE SERPL-SCNC: 109 MMOL/L (ref 100–108)
CHOLEST SERPL-MCNC: 184 MG/DL
CO2 SERPL-SCNC: 29 MMOL/L (ref 21–32)
COLOR UR: YELLOW
CREAT SERPL-MCNC: 0.78 MG/DL (ref 0.6–1.3)
DIAGNOSIS, 93000: NORMAL
DIFFERENTIAL METHOD BLD: ABNORMAL
EOSINOPHIL # BLD: 0.1 K/UL (ref 0–0.4)
EOSINOPHIL NFR BLD: 3 % (ref 0–5)
EPITH CASTS URNS QL MICRO: ABNORMAL /LPF (ref 0–5)
ERYTHROCYTE [DISTWIDTH] IN BLOOD BY AUTOMATED COUNT: 13.4 % (ref 11.6–14.5)
GLOBULIN SER CALC-MCNC: 3.3 G/DL (ref 2–4)
GLUCOSE SERPL-MCNC: 115 MG/DL (ref 74–99)
GLUCOSE UR STRIP.AUTO-MCNC: NEGATIVE MG/DL
HCT VFR BLD AUTO: 40 % (ref 35–45)
HDLC SERPL-MCNC: 50 MG/DL (ref 40–60)
HDLC SERPL: 3.7 {RATIO} (ref 0–5)
HGB BLD-MCNC: 13.5 G/DL (ref 12–16)
HGB UR QL STRIP: NEGATIVE
KETONES UR QL STRIP.AUTO: ABNORMAL MG/DL
LDLC SERPL CALC-MCNC: 117.8 MG/DL (ref 0–100)
LEUKOCYTE ESTERASE UR QL STRIP.AUTO: ABNORMAL
LIPID PROFILE,FLP: ABNORMAL
LYMPHOCYTES # BLD: 1.6 K/UL (ref 0.9–3.6)
LYMPHOCYTES NFR BLD: 36 % (ref 21–52)
MAGNESIUM SERPL-MCNC: 1.9 MG/DL (ref 1.6–2.6)
MCH RBC QN AUTO: 30.2 PG (ref 24–34)
MCHC RBC AUTO-ENTMCNC: 33.8 G/DL (ref 31–37)
MCV RBC AUTO: 89.5 FL (ref 74–97)
MONOCYTES # BLD: 0.3 K/UL (ref 0.05–1.2)
MONOCYTES NFR BLD: 6 % (ref 3–10)
NEUTS SEG # BLD: 2.5 K/UL (ref 1.8–8)
NEUTS SEG NFR BLD: 55 % (ref 40–73)
NITRITE UR QL STRIP.AUTO: NEGATIVE
P-R INTERVAL, ECG05: 156 MS
PH UR STRIP: 7 [PH] (ref 5–8)
PLATELET # BLD AUTO: 217 K/UL (ref 135–420)
PMV BLD AUTO: 10.2 FL (ref 9.2–11.8)
POTASSIUM SERPL-SCNC: 4.3 MMOL/L (ref 3.5–5.5)
PROT SERPL-MCNC: 7.1 G/DL (ref 6.4–8.2)
PROT UR STRIP-MCNC: ABNORMAL MG/DL
Q-T INTERVAL, ECG07: 408 MS
QRS DURATION, ECG06: 128 MS
QTC CALCULATION (BEZET), ECG08: 446 MS
RBC # BLD AUTO: 4.47 M/UL (ref 4.2–5.3)
SODIUM SERPL-SCNC: 142 MMOL/L (ref 136–145)
SP GR UR REFRACTOMETRY: 1.02 (ref 1–1.03)
TRIGL SERPL-MCNC: 81 MG/DL (ref ?–150)
TSH SERPL DL<=0.05 MIU/L-ACNC: 0.65 UIU/ML (ref 0.36–3.74)
URATE SERPL-MCNC: 5 MG/DL (ref 2.6–7.2)
UROBILINOGEN UR QL STRIP.AUTO: 1 EU/DL (ref 0.2–1)
VENTRICULAR RATE, ECG03: 72 BPM
VLDLC SERPL CALC-MCNC: 16.2 MG/DL
WBC # BLD AUTO: 4.5 K/UL (ref 4.6–13.2)
WBC URNS QL MICRO: ABNORMAL /HPF (ref 0–5)

## 2019-05-11 PROCEDURE — 83735 ASSAY OF MAGNESIUM: CPT

## 2019-05-11 PROCEDURE — 93005 ELECTROCARDIOGRAM TRACING: CPT

## 2019-05-11 PROCEDURE — 80053 COMPREHEN METABOLIC PANEL: CPT

## 2019-05-11 PROCEDURE — 80061 LIPID PANEL: CPT

## 2019-05-11 PROCEDURE — 81001 URINALYSIS AUTO W/SCOPE: CPT

## 2019-05-11 PROCEDURE — 36415 COLL VENOUS BLD VENIPUNCTURE: CPT

## 2019-05-11 PROCEDURE — 84443 ASSAY THYROID STIM HORMONE: CPT

## 2019-05-11 PROCEDURE — 85025 COMPLETE CBC W/AUTO DIFF WBC: CPT

## 2019-05-11 PROCEDURE — 84550 ASSAY OF BLOOD/URIC ACID: CPT

## 2019-05-13 ENCOUNTER — OFFICE VISIT (OUTPATIENT)
Dept: SURGERY | Age: 49
End: 2019-05-13

## 2019-05-13 VITALS
HEIGHT: 63 IN | DIASTOLIC BLOOD PRESSURE: 81 MMHG | SYSTOLIC BLOOD PRESSURE: 117 MMHG | TEMPERATURE: 98.5 F | BODY MASS INDEX: 39.51 KG/M2 | RESPIRATION RATE: 18 BRPM | HEART RATE: 98 BPM | OXYGEN SATURATION: 98 % | WEIGHT: 223 LBS

## 2019-05-13 DIAGNOSIS — E03.9 ACQUIRED HYPOTHYROIDISM: ICD-10-CM

## 2019-05-13 DIAGNOSIS — E78.5 HYPERLIPIDEMIA, UNSPECIFIED HYPERLIPIDEMIA TYPE: ICD-10-CM

## 2019-05-13 DIAGNOSIS — E66.01 MORBID OBESITY (HCC): Primary | ICD-10-CM

## 2019-05-13 DIAGNOSIS — G43.909 MIGRAINE WITHOUT STATUS MIGRAINOSUS, NOT INTRACTABLE, UNSPECIFIED MIGRAINE TYPE: ICD-10-CM

## 2019-05-13 DIAGNOSIS — F32.A DEPRESSION, UNSPECIFIED DEPRESSION TYPE: ICD-10-CM

## 2019-05-13 DIAGNOSIS — J45.909 ASTHMA, UNSPECIFIED ASTHMA SEVERITY, UNSPECIFIED WHETHER COMPLICATED, UNSPECIFIED WHETHER PERSISTENT: ICD-10-CM

## 2019-05-13 DIAGNOSIS — G47.33 OBSTRUCTIVE SLEEP APNEA: ICD-10-CM

## 2019-05-13 NOTE — PROGRESS NOTES
Chief Complaint   Patient presents with    Weight Management     initial consult restarting program. dropped out 2 years ago for surgery. Body mass index is 39.5 kg/m².

## 2019-05-13 NOTE — PROGRESS NOTES
Initial Consultation for Weight Loss    Rohit Gonsales is a 53 y.o. female who comes into the office today for initial consultation for the options for the treatment of obesity. The patient initially identified obesity at the age of 33 and at age 26 weighed 110lbs. She has tried a variety of unsupervised weight-loss attempts including Weight Watchers, dietician, family doctor, Debi Zhang, Nutrisystem, Optifast,and exercise but has yet to meet with lasting success. Maximum weight lost on a diet is about 40 lbs, but that the weight loss always seems to return. Today, the patient is  Height: 5' 3\" (160 cm) tall, Weight: 101.2 kg (223 lb) lbs for a Body mass index is 39.5 kg/m². It is due to the patient's obesity, which is further complicated by multijoint arthropathy, asthma, hypothyroid, HDL, seasonal allergies, suellen (non-compliant)  that the patient is now seeking out medically supervised VLCD.       Ideal body weight: 52.4 kg (115 lb 8.3 oz)  Adjusted ideal body weight: 71.9 kg (158 lb 8.2 oz) (Based on [employer] Tables)      Wt Readings from Last 10 Encounters:   05/13/19 101.2 kg (223 lb)   07/28/17 97.2 kg (214 lb 3.2 oz)   07/20/17 95.9 kg (211 lb 6.4 oz)   07/12/17 96.9 kg (213 lb 9.6 oz)   06/20/17 96.3 kg (212 lb 3.2 oz)   04/05/17 95.3 kg (210 lb)   04/04/17 96.5 kg (212 lb 12.8 oz)   02/28/17 98.9 kg (218 lb)   02/20/17 96.9 kg (213 lb 9.6 oz)   01/26/17 95.7 kg (211 lb)       Weight Metrics 5/13/2019 5/13/2019 7/28/2017 7/20/2017 7/12/2017 6/20/2017 4/5/2017   Weight - 223 lb 214 lb 3.2 oz 211 lb 6.4 oz 213 lb 9.6 oz 212 lb 3.2 oz 210 lb   Waist Measure Inches 42.5 - - - - - -   Exercise Mins/week - - - - - - -   Body Fat % - - - - - - -   BMI - 39.5 kg/m2 37.94 kg/m2 37.45 kg/m2 37.84 kg/m2 37.59 kg/m2 37.2 kg/m2           History of binge eating: no    History of purging: no    Major lifestyle changes: no   Other commitments: no   Any potential unsupportive: no     Has Thalia ever been told by a physician not to exercise: no    Does Thalia know of any reason they shouldn't exercise: no  If yes, why?  n/a    Does Thalia have any food allergies or sensitivities: sulfa/morphine and gluten. MWL questionnaire reviewed. If female:  No LMP recorded. Patient has had a hysterectomy. Past Medical History:   Diagnosis Date    Asthma     Contact dermatitis and other eczema, due to unspecified cause     ezcema    Depression     Headache     migraines-following with Neuro at [facility]    Heart murmur     HX OTHER MEDICAL     menieres disease    Hypothyroid     Nodule of vagina 7/2016    nodule vaginal cuff- repeat US in 3-6 mths    JOAN on CPAP     Thyroid disease     HYPOTHYROIDISM       Past Surgical History:   Procedure Laterality Date    ABDOMEN SURGERY PROC [facility] LEG/ANKLE SURGERY PROCEDURE Left     torn perinieal tendon and AFT ligament left ankle    HX CHOLECYSTECTOMY  08/2016    HX HYSTERECTOMY  2013    also uterine polyp    LAP,CHOLECYSTECTOMY N/A 08/15/2016    Dr. Margie Reardon       Current Outpatient Medications   Medication Sig Dispense Refill    levothyroxine (SYNTHROID) 150 mcg tablet Take 1 Tab by mouth Daily (before breakfast). NEEDS TO BE SEEN IN OFFICE BEFORE FURTHER REFILLS 30 Tab 0    ZOLMitriptan (ZOMIG) 5 mg tablet Take 1 Tab by mouth as needed for Migraine. 10 Tab 3    venlafaxine-SR (EFFEXOR-XR) 150 mg capsule TAKE 1 CAPSULE BY MOUTH DAILY 90 Cap 0    SUMAtriptan (IMITREX) 50 mg tablet Take 1 Tab by mouth once as needed for Migraine for up to 1 dose. 10 Tab 3    cyclobenzaprine (FLEXERIL) 10 mg tablet Take 1 Tab by mouth three (3) times daily as needed for Muscle Spasm(s). 15 Tab 0    albuterol (PROVENTIL HFA, VENTOLIN HFA, PROAIR HFA) 90 mcg/actuation inhaler Take 2 Puffs by inhalation every four (4) hours as needed for Wheezing or Shortness of Breath.  1 Inhaler 3    multivitamin (ONE A DAY) tablet Take 1 Tab by mouth daily. Allergies   Allergen Reactions    Anesthetics - Niurka Type- Parabens Itching    Sulfa (Sulfonamide Antibiotics) Shortness of Breath    Anesthetics - Amide Type Itching       Social History     Tobacco Use    Smoking status: Never Smoker    Smokeless tobacco: Never Used   Substance Use Topics    Alcohol use: No     Alcohol/week: 0.0 oz    Drug use: No       Family History   Problem Relation Age of Onset    Diabetes Father        Family Status   Relation Name Status    Father  Alive        DM, HLD    Mother  Alive        HLD, CHF, hypothyroidism    Brother          suicide       Review of Systems:   ROS    Positive in BOLD  CONST: Fever, weight loss, fatigue or chills  GI: Nausea, vomiting, abdominal pain, change in bowel habits, hematochezia, melena, and GERD   INTEG: Dermatitis, abnormal moles  HEENT: Recent changes in vision, vertigo, epistaxis, dysphagia and hoarseness  CV: Chest pain, palpitations, HTN, edema and varicosities  RESP: Cough, shortness of breath, wheezing, hemoptysis, snoring and reactive airway disease  : Hematuria, dysuria, frequency, urgency, nocturia and stress urinary incontinence   MS: Weakness, joint pain and arthritis  ENDO: Diabetes, thyroid disease, polyuria, polydipsia, polyphagia, poor wound healing, heat intolerance, cold intolerance  LYMPH/HEME: Anemia, bruising and history of blood transfusions  NEURO: Dizziness, headache, fainting, seizures and stroke  PSYCH: Anxiety and depression      Physical Exam    Visit Vitals  /81   Pulse 98   Temp 98.5 °F (36.9 °C) (Oral)   Resp 18   Ht 5' 3\" (1.6 m)   Wt 101.2 kg (223 lb)   SpO2 98%   BMI 39.50 kg/m²               General: AAOX3, pleasant and cooperative to exam. Appropriately groomed. NAD. Non-toxic in appearance. Appears stated age. HENT: NC/AT. PERRLA. Extraocular motions are intact. Sclera anicteric, Conjunctiva Clear. Nares clear. Oropharynx pink, moist without exudate or erythema.  Uvula Midline. Neck:  Supple, trachea is midline. No JVD, Lymphadenopathy. No bruits. Chest: Good equal bilateral expansion  Lungs: Clear to auscultation bilaterally without e/o crackles, wheezes or rhales. Heart: RRR, S1 and S2 noted. No c/r/m/g/vpmi. Abdomen: obese, soft and non-tender without distension. Good bowel sounds. No vis/palp masses or pulsations. No organo-splenomegaly. No hernias to my exam. No e/o acute abdomen or peritoneal signs. Pelvis: Stable. :  Deferred  Rectal: Deferred  Extremities: Positive pulses in all 4 extremities. Baseline range of motion in all 4 extremities. Strength, sensation and reflexes intact, appropriate and equal in b/l upper and lower extremities. No C/C/E  Neuro: CN II-XII grossly intact without focal deficit. Ambulatory. Skin: Clean, warm and dry. Workup  Labs:Reviewed with Pt. Possible UTI, pt to d/w PCP asymptomatic. Mild lft elevation will follow. EKG:Normal sinus rhythm. Right bundle branch block. Minimal voltage criteria for LVH, may be normal variant. Abnormal ECG. When compared with ECG of 12-AUG-2016 13:33, No significant change was found. Confirmed by Luz Kelley is a 53 y.o. female who is suffering from obesity with a BMI of Body mass index is 39.5 kg/m². and comorbidities including those listed above  who would benefit from weight loss. ICD-10-CM ICD-9-CM    1. Morbid obesity ([facility] J22.66 024.84 METABOLIC PANEL, COMPREHENSIVE      URIC ACID   2. BMI 39.0-39.9,adult L95.98 L63.07 METABOLIC PANEL, COMPREHENSIVE      URIC ACID   3. Asthma, unspecified asthma severity, unspecified whether complicated, unspecified whether persistent J45.909 493.90    4. Migraine without status migrainosus, not intractable, unspecified migraine type G43.909 346.90    5. Hyperlipidemia, unspecified hyperlipidemia type E78.5 272.4    6. Acquired hypothyroidism E03.9 244.9    7.  Depression, unspecified depression type F32.9 311 8. Obstructive sleep apnea G47.33 327.23        Diet regimen VLCD   # of meal replacements prescribed: 4   If modified LCD-nutritional guidelines: n/a    Monthly Goal   12-15 lbs. Medical monitoring schedule:   Weekly BP/Weight checks   Monthly provider appointments              Monthly CMP, uric acid checks    Pt would like to lose almost 100lbs. Discussed realistic expectations and surgery as a possibility. Will continually assess need and/or desire to switch into surgical arm of program and reassess pt's progress at 4 mos. I have reviewed/discussed the above normal BMI with the patient. I have recommended the following interventions: dietary management education, guidance, and counseling . Ms. Adore Canales has a reminder for a \"due or due soon\" health maintenance. I have asked that she contact her primary care provider for follow-up on this health maintenance.     Denilson Lozada MS, PA-C

## 2019-05-13 NOTE — PATIENT INSTRUCTIONS
Monthly goal:      4 meal replacements daily, no other food. First one within about 1 hour of waking up to get metabolism started. Don't go more than 6 hours between meals. No more than 1 soup a day, this is too much salt. No more than 1 bar a day, this not enough protein. You are allotted 10 carbs extra a day. Recommendations       - Consume your 4 daily meal replacements equally spaced over the day. Dont go more than 6 hours between each meal. Breakfast is especially important!      - Get the support of family and friends. - Snack-proof your home. - Have strategies for social situations, meetings that run over or vacation.       - If you fall off the plan; just start right back. Reflect on those days into examples of what to change or avoid next time. Program Compliance      We do not expect perfection. However, we do ask for your persistence and your willingness to do the work of growing yourself. You will hit plateaus in your weight loss. You will run into situations that test your will power. You will encounter times when you feel frustrated. How your respond to your slip ups and, the adjustments you make to prevent future slip ups will determine you long-term success. If you find yourself temporarily slipping in the program we will gently nudge you forward and encourage you to do the work of identifying and move beyond your stuck areas. However, if you find yourself wavering in the program for a prolonged time (more than 3 months) we will ask that you take a break from the program. At that time you will have 3 options:       1. Consult with one of our weight loss specialists to explore additional weight loss options. 2. Work with a counselor to do some focused work in order to identify and break the patterns that are holding you back. 3. A combination of both these.       Once you, your counselor and/or your weight loss specialist feel that you have moved past those patterns and want to give the program another chance, we will gladly work with you to determine if you're ready to start back in the program.      When returning after a break, if it has been over 3 months you will required to repeat an orientation and, if greater than 6 months, you will be required to repeat an orientation, labs and an EKG. Homework for [employer]        Exercise:   - Daily starting slow, gradually increase your time by 10- 20 % per week     - To prevent injury, take a recovery week every 4 weeks (reduce your exercise time and intensity by 1/2 during this time)     - Your goal is to work up to 150 min a week; hard enough that you can't whistle or sing. It may take 6 months to work up to this. - Call the Health  at [facility] labs for exercise ideas ([phone])          Diet:   VLCD    See brochure for emergency meal instructions.

## 2019-07-15 ENCOUNTER — DOCUMENTATION ONLY (OUTPATIENT)
Dept: SURGERY | Age: 49
End: 2019-07-15

## 2022-02-18 ENCOUNTER — TELEPHONE (OUTPATIENT)
Dept: PHYSICAL THERAPY | Age: 52
End: 2022-02-18

## 2022-03-18 PROBLEM — E66.01 SEVERE OBESITY (HCC): Status: ACTIVE | Noted: 2019-05-13

## 2023-05-03 ENCOUNTER — HOSPITAL ENCOUNTER (OUTPATIENT)
Facility: HOSPITAL | Age: 53
Setting detail: RECURRING SERIES
Discharge: HOME | End: 2023-05-06
Payer: OTHER GOVERNMENT

## 2023-05-03 PROCEDURE — 97535 SELF CARE MNGMENT TRAINING: CPT

## 2023-05-03 PROCEDURE — 95992 CANALITH REPOSITIONING PROC: CPT

## 2023-05-03 PROCEDURE — 97163 PT EVAL HIGH COMPLEX 45 MIN: CPT

## 2023-05-03 NOTE — PROGRESS NOTES
[address] [place]aca, [address] BH:[phone] Fx: [phone]    PLAN OF CARE/ Statement of Necessity for Physical Therapy Services           Patient name: Marielle Messer Start of Care: 5/3/2023   Referral source: Zeyad García : 1970    Medical Diagnosis: Vertigo [R42]       Onset Date:2023    Treatment Diagnosis: H81.11  Benign paroxysmal vertigo, right ear and R42   Dizziness and giddiness                                     Prior Hospitalization: see medical history Provider#: [id number]   Medications: Verified on Patient Summary List     Comorbidities: Anxiety, OA, asthma, LBP, BMI >30, GI disease migraine  Prior Level of Function: Chronic neck pain, migraine with aura, episodic vertigo since     The Plan of Care and following information is based on the information from the initial evaluation. Assessment / key information:  Patient is 47year old female with c/o episodic vertigo which has been going on since . She also reports chronic neck pain and migraine with aura. She states her vertigo symptoms last <60 seconds and occur with looking up, rolling towards the right, when laying down. Comprehensive ENT testing unremarkable. On evaluation patient showing decreased cervical AROM towards right. Decreased balance with balance on compliant surface and vision removed. Right [place] Hallpike positive as well as bilateral head thrust. Central signs negative though patient reporting mild lightheadedness with left vertebral artery screen and Elyse Smaller; will continue to monitor to establish origin. Otherwise patient with signs and symptoms consistent with right BPPV and bilateral peripheral vestibular hypofunction. She would benefit from skilled physical therapy to reduce vertiginous episodes and improve function for return to PLOF.      Evaluation Complexity:  History:  MEDIUM  Complexity : 1-2

## 2023-05-03 NOTE — PROGRESS NOTES
T DAILY TREATMENT NOTE/VESTIBULAR EVAL     Patient Name: Yann Lopez    Date: 5/3/2023    : 1970  Insurance: Payor: [employer] / Plan: [employer] / Product Type: *No Product type* /      Patient  verified yes     Visit #   Current / Total 1 24   Time   In / Out 8:30 9:09   Pain   In / Out 0 0   Subjective Functional Status/Changes: Agreeable to initial evaluation   Changes to:  Meds, Allergies, Med Hx, Sx Hx? If yes, update Summary List no       Treatment Area: Vertigo [R42]    SUBJECTIVE  Pain Level (0-10 scale): 0 pain  Dizziness Level (0-10 scale): 0   []constant [x]intermittent []improving []worsening []no change since onset    Any medication changes, allergies to medications, adverse drug reactions, diagnosis change, or new procedure performed?: [x] No    [] Yes (see summary sheet for update)  Subjective functional status/changes:     PLOF: Intermittent vertigo since , neck pain and migraines  Limitations to PLOF: Vertigo with laying down and turning towards right  Mechanism of Injury: Unknown  Current symptoms/Complaints: Decreased tolerance to laying, looking up, and rolling interfering with recreational exercise and occupational tasks. Previous Treatment/Compliance: Comprehensive ENT. Meclizine PRN  PMHx/Surgical Hx: Patient reports suffering from migraine with aura and chronic neck pain  Work Hx: Full time teacher   Living Situation: With   Pt Goals: \"Reduce/stop the vertigo episodes  Barriers: []pain []financial []time []transportation []other  Motivation: High  Substance use: []Alcohol []Tobacco []other:   FABQ Score: []low []elevate  Cognition: A & O x 4    Other:    OBJECTIVE/EXAMINATION  Domestic Life: See above  Activity/Recreational Limitations: See above  Mobility: fully independent without device  Self Care: fully independent      23 min [x]Eval    - untimed        Therapeutic Procedures:   Tx Min Billable or 1:1 Min (if diff from [employer]) Procedure, Rationale,

## 2023-05-18 ENCOUNTER — HOSPITAL ENCOUNTER (OUTPATIENT)
Facility: HOSPITAL | Age: 53
Setting detail: RECURRING SERIES
Discharge: HOME | End: 2023-05-21
Payer: OTHER GOVERNMENT

## 2023-05-18 PROCEDURE — 97140 MANUAL THERAPY 1/> REGIONS: CPT

## 2023-05-18 PROCEDURE — 95992 CANALITH REPOSITIONING PROC: CPT

## 2023-05-18 PROCEDURE — 97110 THERAPEUTIC EXERCISES: CPT

## 2023-05-18 PROCEDURE — 97112 NEUROMUSCULAR REEDUCATION: CPT

## 2023-05-25 ENCOUNTER — HOSPITAL ENCOUNTER (OUTPATIENT)
Facility: HOSPITAL | Age: 53
Setting detail: RECURRING SERIES
Discharge: HOME | End: 2023-05-28
Payer: OTHER GOVERNMENT

## 2023-05-25 PROCEDURE — 97140 MANUAL THERAPY 1/> REGIONS: CPT

## 2023-05-25 PROCEDURE — 97110 THERAPEUTIC EXERCISES: CPT

## 2023-05-25 PROCEDURE — 97112 NEUROMUSCULAR REEDUCATION: CPT

## 2023-06-01 ENCOUNTER — APPOINTMENT (OUTPATIENT)
Facility: HOSPITAL | Age: 53
End: 2023-06-01
Payer: OTHER GOVERNMENT

## 2023-06-01 ENCOUNTER — TELEPHONE (OUTPATIENT)
Facility: HOSPITAL | Age: 53
End: 2023-06-01

## 2023-06-06 ENCOUNTER — APPOINTMENT (OUTPATIENT)
Facility: HOSPITAL | Age: 53
End: 2023-06-06
Payer: OTHER GOVERNMENT

## 2023-06-13 ENCOUNTER — APPOINTMENT (OUTPATIENT)
Facility: HOSPITAL | Age: 53
End: 2023-06-13
Payer: OTHER GOVERNMENT

## 2023-06-20 ENCOUNTER — APPOINTMENT (OUTPATIENT)
Facility: HOSPITAL | Age: 53
End: 2023-06-20
Payer: OTHER GOVERNMENT

## 2023-06-22 ENCOUNTER — HOSPITAL ENCOUNTER (OUTPATIENT)
Facility: HOSPITAL | Age: 53
Setting detail: RECURRING SERIES
Discharge: HOME | End: 2023-06-25
Payer: OTHER GOVERNMENT

## 2023-06-22 PROCEDURE — 97140 MANUAL THERAPY 1/> REGIONS: CPT

## 2023-06-22 PROCEDURE — 97110 THERAPEUTIC EXERCISES: CPT

## 2023-06-22 PROCEDURE — 97112 NEUROMUSCULAR REEDUCATION: CPT

## 2023-06-27 ENCOUNTER — APPOINTMENT (OUTPATIENT)
Facility: HOSPITAL | Age: 53
End: 2023-06-27
Payer: OTHER GOVERNMENT

## 2023-06-30 ENCOUNTER — HOSPITAL ENCOUNTER (OUTPATIENT)
Facility: HOSPITAL | Age: 53
Setting detail: RECURRING SERIES
End: 2023-06-30
Payer: OTHER GOVERNMENT

## 2023-06-30 PROCEDURE — 97140 MANUAL THERAPY 1/> REGIONS: CPT

## 2023-06-30 PROCEDURE — 97110 THERAPEUTIC EXERCISES: CPT

## 2025-08-04 ENCOUNTER — TELEPHONE (OUTPATIENT)
Facility: HOSPITAL | Age: 55
End: 2025-08-04